# Patient Record
Sex: MALE | Race: BLACK OR AFRICAN AMERICAN | NOT HISPANIC OR LATINO | Employment: FULL TIME | URBAN - METROPOLITAN AREA
[De-identification: names, ages, dates, MRNs, and addresses within clinical notes are randomized per-mention and may not be internally consistent; named-entity substitution may affect disease eponyms.]

---

## 2017-07-26 ENCOUNTER — OFFICE VISIT (OUTPATIENT)
Dept: FAMILY MEDICINE | Facility: HOSPITAL | Age: 42
End: 2017-07-26
Attending: FAMILY MEDICINE

## 2017-07-26 VITALS
HEIGHT: 67 IN | BODY MASS INDEX: 32.35 KG/M2 | WEIGHT: 206.13 LBS | SYSTOLIC BLOOD PRESSURE: 129 MMHG | HEART RATE: 69 BPM | DIASTOLIC BLOOD PRESSURE: 80 MMHG

## 2017-07-26 DIAGNOSIS — R23.8 DRY SCALP: ICD-10-CM

## 2017-07-26 DIAGNOSIS — N50.89 TESTICULAR SWELLING, RIGHT: ICD-10-CM

## 2017-07-26 DIAGNOSIS — F32.A DEPRESSION, UNSPECIFIED DEPRESSION TYPE: ICD-10-CM

## 2017-07-26 DIAGNOSIS — Z72.51 UNPROTECTED SEX: Primary | ICD-10-CM

## 2017-07-26 PROCEDURE — 99213 OFFICE O/P EST LOW 20 MIN: CPT | Performed by: FAMILY MEDICINE

## 2017-07-26 RX ORDER — KETOCONAZOLE 20 MG/ML
SHAMPOO, SUSPENSION TOPICAL
Qty: 120 ML | Refills: 1 | Status: SHIPPED | OUTPATIENT
Start: 2017-07-27 | End: 2021-01-15

## 2017-07-26 RX ORDER — FLUOXETINE HYDROCHLORIDE 20 MG/1
60 CAPSULE ORAL DAILY
Qty: 90 CAPSULE | Refills: 6 | Status: SHIPPED | OUTPATIENT
Start: 2017-07-26 | End: 2017-08-01 | Stop reason: SDUPTHER

## 2017-07-26 NOTE — PROGRESS NOTES
"Subjective:       Patient ID: Daniel Akins Jr. is a 41 y.o. male.    Chief Complaint: Penis Pain; Dysuria; and Hair/Scalp Problem    HPI Pt is 42 yo male with a hx of depression who presents to clinic complaining of burning with urination for a month. Also endorses white penile discharge x1 that occurred 2 weeks ago. He denies hematuria. Pt previously complained of these symptoms twice before last year with negative GC/CT, RPR, HIV. Is currently sexually active with female partner but denies urinary or STI sx in partner. Also reports occasional pain and swelling in his right testicle. Pt was concerned that his symptoms were related to cysts that were found on his girlfriend's ovaries. Also complains of excessive dry, pruritic skin on his scalp especially after a haircut. Has had this problem since childhood.  Pt reports taking Prozac  40 mg as prescribed and that he thinks it helps, but reports still being "christian and irritated" at times.    Review of Systems   Constitutional: Negative for activity change, appetite change, chills and fever.   Eyes: Negative for visual disturbance.   Respiratory: Negative for cough and shortness of breath.    Cardiovascular: Negative for chest pain.   Gastrointestinal: Negative for vomiting.   Endocrine: Negative for polydipsia and polyuria.   Genitourinary: Positive for discharge, dysuria and scrotal swelling. Negative for frequency and hematuria.   Musculoskeletal: Negative for arthralgias.   Skin: Negative for rash.   Neurological: Negative for dizziness and headaches.   Psychiatric/Behavioral:        Depressed mood       Objective:      Vitals:    07/26/17 1113   BP: 129/80   Pulse: 69     Physical Exam   Constitutional: He is oriented to person, place, and time. He appears well-developed and well-nourished.   HENT:   Head: Normocephalic and atraumatic.   Scattered areas of dry flaky scalp   Cardiovascular: Normal rate, regular rhythm and normal heart sounds.  "   Pulmonary/Chest: Effort normal and breath sounds normal.   Abdominal: Soft. Bowel sounds are normal.   Genitourinary: Prostate normal and penis normal. No penile tenderness.   Genitourinary Comments: No penile discharge expressed. Mild swelling of right testicle with bilateral mild TTP however no erythema. No penile rashes or sores   Musculoskeletal: Normal range of motion.   Neurological: He is alert and oriented to person, place, and time.       U/A: Negative for Leukocytes, Nitrates and blood    Assessment:       1. Unprotected sex    2. Depression, unspecified depression type    3. Testicular swelling, right    4. Dry scalp        Plan:       Unprotected sex  -     POCT URINALYSIS-Negative for Leukocytes, Nitrates and blood  -     RPR; Future; Expected date: 07/26/2017  -     C. trachomatis/N. gonorrhoeae by AMP DNA Urine  -     Hepatitis panel, acute; Future; Expected date: 07/26/2017  -     HIV-1 and HIV-2 antibodies; Future; Expected date: 07/26/2017  -     Encouraged safe sex practices    Depression, unspecified depression type    -   fluoxetine (PROZAC) 20 MG capsule; Take 3 capsules (60 mg total) by mouth once         daily.  Dispense: 90 capsule; Refill: 6           -  PHQ-9 today 15, previously 11 last year. Will increase from 40 mg to 60 mg    Testicular swelling, right  -     US Scrotum And Testicles; Future; Expected date: 07/26/2017    Dry scalp   -     ketoconazole (NIZORAL) 2 % shampoo; Apply topically twice a week.  Dispense:             120 mL; Refill: 1      Return in about 4 weeks (around 8/23/2017).     Brittney Villalta MD  7/26/2017  [facility] HO[facility]

## 2017-07-27 ENCOUNTER — TELEPHONE (OUTPATIENT)
Dept: FAMILY MEDICINE | Facility: HOSPITAL | Age: 42
End: 2017-07-27

## 2017-08-02 RX ORDER — FLUOXETINE HYDROCHLORIDE 20 MG/1
60 CAPSULE ORAL DAILY
Qty: 90 CAPSULE | Refills: 6 | Status: SHIPPED | OUTPATIENT
Start: 2017-08-02 | End: 2021-01-15

## 2017-08-30 ENCOUNTER — OFFICE VISIT (OUTPATIENT)
Dept: FAMILY MEDICINE | Facility: HOSPITAL | Age: 42
End: 2017-08-30

## 2017-08-30 VITALS
HEART RATE: 67 BPM | SYSTOLIC BLOOD PRESSURE: 112 MMHG | BODY MASS INDEX: 34.44 KG/M2 | WEIGHT: 214.31 LBS | HEIGHT: 66 IN | DIASTOLIC BLOOD PRESSURE: 69 MMHG

## 2017-08-30 DIAGNOSIS — L84 CALLUS OF FOOT: ICD-10-CM

## 2017-08-30 DIAGNOSIS — R30.0 DYSURIA: Primary | ICD-10-CM

## 2017-08-30 DIAGNOSIS — F32.A DEPRESSION, UNSPECIFIED DEPRESSION TYPE: ICD-10-CM

## 2017-08-30 PROCEDURE — 99213 OFFICE O/P EST LOW 20 MIN: CPT | Performed by: FAMILY MEDICINE

## 2017-08-30 NOTE — PROGRESS NOTES
I assume primary medical responsibility for this patient, I have reviewed the case history, findings, diagnosis and treatment plan with the resident and agree that the care is reasonable and necessary. This service has been performed by a resident without the presence of a teaching physician under the primary care exception  Dyan Blackmon  8/30/2017

## 2017-08-30 NOTE — PROGRESS NOTES
Subjective:       Patient ID: Daniel Akins Jr. is a 41 y.o. male.    Chief Complaint: Follow-up    HPI Patient is 42 yo male with a PMHx of Depression who presents to clinic for follow-up. At last visit, Prozac dosage was increased from 40-60 mg. Patient endorses improved mood after increase. Does endorse intermittent burning with urination and days following sexual intercourse/ejaculation. Also admits to burning discomfort with bowel movement in addition to pressure like sensation following bowel movement. Denies penile discharge, hematuria, bloody stools, changes in bowel habits. Has had intermittent dysuria x 9 months with workup including U/A, RPR, GC/CT, HIV all which have been negative. Patient has also been empirically treated for GC/CT. Does endorse unprotected sex with ex-wife.    Review of Systems   Constitutional: Negative for chills and fever.   Respiratory: Negative for shortness of breath.    Gastrointestinal: Positive for rectal pain (pressure like discomfort after bowel movement). Negative for abdominal pain, nausea and vomiting.   Endocrine: Negative for polyuria.   Genitourinary: Positive for dysuria (intermittent). Negative for decreased urine volume, discharge, frequency, genital sores, penile pain, testicular pain and urgency.   Musculoskeletal: Negative for arthralgias and back pain.       Objective:      Vitals:    08/30/17 1349   BP: 112/69   Pulse: 67     Physical Exam   Constitutional: He is oriented to person, place, and time. He appears well-developed and well-nourished.   HENT:   Head: Normocephalic and atraumatic.   Cardiovascular: Normal rate, regular rhythm, normal heart sounds and intact distal pulses.    Pulmonary/Chest: Effort normal and breath sounds normal.   Abdominal: Soft. Bowel sounds are normal. There is no tenderness.   Genitourinary:   Genitourinary Comments: Declined  exam   Musculoskeletal: Normal range of motion.   Neurological: He is alert and oriented to person,  place, and time.   Skin: Skin is warm and dry.   Right 5th toe callus   Psychiatric: Judgment and thought content normal.       Assessment:       1. Dysuria    2. Callus of foot    3. Depression, unspecified depression type        Plan:       Dysuria  -     Ambulatory referral to Urology        -     Patient with chronic intermittent dysuria and painful ejaculation and bowel                   movements with negative workup and continued symptoms despite empiric               treatment    Callus of foot  -     salicylic acid 17 % gel; Apply topically once daily.; Refill: 0  -     Right foot 5th toe    Depression        -    Improved mood after increasing Fluoxetine from 40 mg to 60 mg at last visit.              Will continue 60 mg    Return in about 4 weeks (around 9/27/2017).     Brittney Villalta MD  8/30/2017  [facility]

## 2017-11-13 ENCOUNTER — OFFICE VISIT (OUTPATIENT)
Dept: FAMILY MEDICINE | Facility: HOSPITAL | Age: 42
End: 2017-11-13
Attending: FAMILY MEDICINE

## 2017-11-13 VITALS
HEART RATE: 83 BPM | HEIGHT: 66 IN | DIASTOLIC BLOOD PRESSURE: 71 MMHG | SYSTOLIC BLOOD PRESSURE: 127 MMHG | BODY MASS INDEX: 35.89 KG/M2 | WEIGHT: 223.31 LBS

## 2017-11-13 DIAGNOSIS — Z23 NEED FOR PROPHYLACTIC VACCINATION AND INOCULATION AGAINST INFLUENZA: ICD-10-CM

## 2017-11-13 DIAGNOSIS — K92.1 BLOOD IN STOOL: ICD-10-CM

## 2017-11-13 DIAGNOSIS — N41.9 PROSTATITIS, UNSPECIFIED PROSTATITIS TYPE: Primary | ICD-10-CM

## 2017-11-13 PROBLEM — R30.0 DYSURIA: Status: ACTIVE | Noted: 2017-11-13

## 2017-11-13 PROCEDURE — 99213 OFFICE O/P EST LOW 20 MIN: CPT | Performed by: FAMILY MEDICINE

## 2017-11-13 RX ORDER — CIPROFLOXACIN 500 MG/1
500 TABLET ORAL EVERY 12 HOURS
Qty: 42 TABLET | Refills: 0 | Status: SHIPPED | OUTPATIENT
Start: 2017-11-13 | End: 2017-12-04

## 2017-11-13 NOTE — PROGRESS NOTES
Subjective:       Patient ID: Daniel Akins Jr. is a 41 y.o. male.    Chief Complaint: Rectal Bleeding    HPI Patient is 42 yo male with a PMHx of Depression who presents to clinic for follow-up. Patient reports intermittent burning with urination. Does endorse unprotected sex last week. Denies penile discharge or blood. Denies current burning pain with sexual intercourse/ejaculation but did endorse previously. Also admits to rectal discomfort with bowel movement. Has had intermittent dysuria x 9 months with workup including U/A, RPR, GC/CT, HIV all which have been negative. Patient has also been empirically treated for GC/CT.  Reports episode last night of bright red blood per rectum mixed with stool, in toilet water and on toilet paper. Denies previous episodes. Has BM 1-2x per day and denies changes in frequency. Does say at baseline caliber of stool frequently changes.  Denies NSAID use or GERD.     Review of Systems   Constitutional: Negative for chills and fever (last week).   Respiratory: Negative for shortness of breath.    Gastrointestinal: Negative for abdominal pain, nausea and vomiting.   Endocrine: Negative for polyuria.   Genitourinary: Positive for dysuria (intermittent). Negative for decreased urine volume, discharge, frequency, genital sores, penile pain, testicular pain and urgency.   Musculoskeletal: Negative for arthralgias and back pain.       Objective:      Vitals:    11/13/17 1519   BP: 127/71   Pulse: 83     Physical Exam   Constitutional: He is oriented to person, place, and time. He appears well-developed and well-nourished.   HENT:   Head: Normocephalic and atraumatic.   Cardiovascular: Normal rate, regular rhythm, normal heart sounds and intact distal pulses.    Pulmonary/Chest: Effort normal and breath sounds normal.   Abdominal: Soft. Bowel sounds are normal. There is no tenderness.   Genitourinary: Penis normal.   Genitourinary Comments: No penile discharge. No external or internal  hemorrhoids appreciated, slightly boggy prostate   Musculoskeletal: Normal range of motion.   Neurological: He is alert and oriented to person, place, and time.   Skin: Skin is warm and dry.   Psychiatric: Judgment and thought content normal.       Assessment:       1. Prostatitis, unspecified prostatitis type    2. Blood in stool    3. Need for prophylactic vaccination and inoculation against influenza        Plan:       Prostatitis, unspecified prostatitis type        -    Pt with complaints of intermittent dysuria,                    ejaculatory pain. Mildly boggy prostate on physical             exam  -     ciprofloxacin HCl (CIPRO) 500 MG tablet; Take 1 tablet (500 mg total) by mouth every 12 (twelve) hours.  Dispense: 42 tablet; Refill: 0    Blood in stool         -    Clinic fecal occult blood test negative         -    No hemorrhoids on PE         -    Asked patient to return if further episodes of                  bloody stool and will further work-up    Need for prophylactic vaccination and inoculation against influenza      Return if symptoms worsen or fail to improve.     Brittney Villalta MD  11/13/2017  [facility] HO[facility]          Flu consent signed by patient. Flu vaccine administered.

## 2018-01-06 ENCOUNTER — HOSPITAL ENCOUNTER (EMERGENCY)
Facility: HOSPITAL | Age: 43
Discharge: HOME | End: 2018-01-06
Attending: EMERGENCY MEDICINE

## 2018-01-06 VITALS
OXYGEN SATURATION: 99 % | SYSTOLIC BLOOD PRESSURE: 135 MMHG | TEMPERATURE: 99 F | RESPIRATION RATE: 18 BRPM | BODY MASS INDEX: 35.51 KG/M2 | DIASTOLIC BLOOD PRESSURE: 82 MMHG | HEART RATE: 91 BPM | WEIGHT: 220 LBS

## 2018-01-06 DIAGNOSIS — H10.029 PINK EYE DISEASE, UNSPECIFIED LATERALITY: Primary | ICD-10-CM

## 2018-01-06 PROCEDURE — 99283 EMERGENCY DEPT VISIT LOW MDM: CPT

## 2018-01-06 PROCEDURE — 99283 EMERGENCY DEPT VISIT LOW MDM: CPT | Mod: ,,, | Performed by: EMERGENCY MEDICINE

## 2018-01-06 RX ORDER — ERYTHROMYCIN 5 MG/G
OINTMENT OPHTHALMIC
Qty: 1 TUBE | Refills: 0 | Status: SHIPPED | OUTPATIENT
Start: 2018-01-06 | End: 2021-01-15

## 2018-01-06 NOTE — ED NOTES
Appearance:  Pt awake, alert & oriented to person, place & time.  Pt in no acute distress at present time.  Skin:  Skin warm, dry & intact.  Mucous membranes moist.  Skin turgor normal.  Respiratory:  Respirations even, non-labored.    Eyes:  Left eye redness.

## 2018-01-06 NOTE — ED PROVIDER NOTES
Encounter Date: 1/6/2018    SCRIBE #1 NOTE: I, Genevieve Mora, am scribing for, and in the presence of, Dr. Monroe. the APC attestation.       History     Chief Complaint   Patient presents with    Eye Problem     Think i got the pink eye. C/o left eye redness and drainage     Patient is a 42-year-old male with significant past medical history presents to the emergency department due to a 1 day history of red eye.  Patient states that his nephew was recently diagnosed with pinkeye and has been staying with him.  Patient states that he began having a itchy and right eye yesterday.  Patient states that he woke this morning and his eye was closed shut with some yellow mucus discharge.  The patient denies any fevers, chills, chest pain, shortness of breath, cough, cold, or any other complaints.      The history is provided by the patient and medical records.     Review of patient's allergies indicates:  No Known Allergies  History reviewed. No pertinent past medical history.  Past Surgical History:   Procedure Laterality Date    ADENOIDECTOMY      TONSILLECTOMY       History reviewed. No pertinent family history.  Social History   Substance Use Topics    Smoking status: Former Smoker     Years: 10.00     Types: Cigarettes     Quit date: 7/9/2006    Smokeless tobacco: Never Used    Alcohol use No     Review of Systems   Constitutional: Negative for activity change, appetite change, chills, fatigue and fever.   HENT: Negative for congestion, drooling, ear discharge, ear pain, facial swelling, hearing loss, mouth sores, sore throat and trouble swallowing.    Eyes: Positive for discharge and redness. Negative for photophobia and pain.   Respiratory: Negative for apnea, cough, chest tightness, shortness of breath and wheezing.    Cardiovascular: Negative for chest pain and leg swelling.   Gastrointestinal: Negative for abdominal distention, abdominal pain, blood in stool, constipation, diarrhea, nausea and vomiting.    Endocrine: Negative for cold intolerance, polydipsia and polyuria.   Genitourinary: Negative for decreased urine volume, difficulty urinating, enuresis, frequency and hematuria.   Musculoskeletal: Negative for arthralgias, gait problem, myalgias and neck stiffness.   Skin: Negative for color change, rash and wound.   Allergic/Immunologic: Negative for environmental allergies.   Neurological: Negative for dizziness, tremors, syncope, weakness, light-headedness, numbness and headaches.   Hematological: Negative for adenopathy.   Psychiatric/Behavioral: Negative for agitation.       Physical Exam     Initial Vitals [01/06/18 0915]   BP Pulse Resp Temp SpO2   135/82 91 18 98.8 °F (37.1 °C) 99 %      MAP       99.67         Physical Exam    Nursing note and vitals reviewed.  Constitutional: He appears well-developed and well-nourished. He is not diaphoretic. No distress.   HENT:   Head: Normocephalic and atraumatic.   Eyes: EOM are normal. Pupils are equal, round, and reactive to light.   Red left eye    Neck: Normal range of motion. Neck supple.   Cardiovascular: Normal rate, regular rhythm and normal heart sounds. Exam reveals no gallop and no friction rub.    No murmur heard.  Pulmonary/Chest: Breath sounds normal. He has no wheezes. He has no rhonchi. He has no rales.   Abdominal: Soft. Bowel sounds are normal. There is no tenderness. There is no rebound and no guarding.   Musculoskeletal: Normal range of motion.   Neurological: He is alert and oriented to person, place, and time.   Skin: Skin is warm and dry. No rash noted. No erythema.   Psychiatric: He has a normal mood and affect.         ED Course   Procedures  Labs Reviewed - No data to display          Medical Decision Making:   History:   Old Medical Records: I decided to obtain old medical records.       APC / Resident Notes:   Patient is a 42-year-old male with significant past medical history presents to the emergency department due to a 1 day history of  red eye.  Physical exam reveals no distress.  Heart regular rhythm.  Lungs clear to auscultation bilaterally.  Abdomen soft nontender nondistended.  Left eye noted with redness.  Vision 20/20.  Visual be discharged home on erythromycin for presumed conjunctivitis.  Treatment discussed with attending physician he is agreeable.       Scribe Attestation:   Scribe #1: I performed the above scribed service and the documentation accurately describes the services I performed. I attest to the accuracy of the note.    Attending Attestation:     Physician Attestation Statement for NP/PA:   I discussed this assessment and plan of this patient with the NP/PA, but I did not personally examine the patient. The face to face encounter was performed by the NP/PA.                  ED Course      Clinical Impression:   The encounter diagnosis was Pink eye disease, unspecified laterality.    Disposition:   Disposition: Discharged  Condition: Stable                        Bhavana Luong PA-C  01/06/18 2812

## 2018-01-10 ENCOUNTER — OFFICE VISIT (OUTPATIENT)
Dept: FAMILY MEDICINE | Facility: HOSPITAL | Age: 43
End: 2018-01-10
Attending: FAMILY MEDICINE

## 2018-01-10 VITALS
TEMPERATURE: 99 F | DIASTOLIC BLOOD PRESSURE: 93 MMHG | HEART RATE: 95 BPM | BODY MASS INDEX: 36.06 KG/M2 | WEIGHT: 229.75 LBS | HEIGHT: 67 IN | SYSTOLIC BLOOD PRESSURE: 135 MMHG

## 2018-01-10 DIAGNOSIS — H10.022 PINK EYE, LEFT: ICD-10-CM

## 2018-01-10 DIAGNOSIS — J32.9 SINUSITIS, UNSPECIFIED CHRONICITY, UNSPECIFIED LOCATION: Primary | ICD-10-CM

## 2018-01-10 PROCEDURE — 99213 OFFICE O/P EST LOW 20 MIN: CPT | Performed by: FAMILY MEDICINE

## 2018-01-10 RX ORDER — CIPROFLOXACIN HYDROCHLORIDE 3 MG/ML
1 SOLUTION/ DROPS OPHTHALMIC
Qty: 5 ML | Refills: 0 | Status: SHIPPED | OUTPATIENT
Start: 2018-01-10 | End: 2021-01-15

## 2018-01-10 RX ORDER — FLUTICASONE PROPIONATE 50 MCG
1 SPRAY, SUSPENSION (ML) NASAL DAILY
Qty: 9.9 BOTTLE | Refills: 2 | Status: SHIPPED | OUTPATIENT
Start: 2018-01-10 | End: 2018-05-02 | Stop reason: SDUPTHER

## 2018-01-11 NOTE — PROGRESS NOTES
Subjective:       Patient ID: Daniel Akins Jr. is a 42 y.o. male.    Chief Complaint: Conjunctivitis and Sinusitis    HPI   Patient is a 42 year old male presenting to clinic for left eye irritation for about 5 days.  Patient was seen in the ED and diagnosed with left sided pink eye.  He was given erythromycin cream to use and states the cream has made his eye worse.  Reports worsening redness, itching, burning sensation in the left eye, crusting upon waking up in the AM.  States he is concerned his right eye is getting infected as well.  Patient's nephew with recent diagnosis of pink eye as well.      Review of Systems   Constitutional: Negative for chills and fever.   HENT: Negative for sore throat.    Eyes: Positive for discharge and redness. Negative for visual disturbance.   Respiratory: Negative for cough, shortness of breath and wheezing.    Cardiovascular: Negative for chest pain, palpitations and leg swelling.   Gastrointestinal: Negative for abdominal pain, constipation, diarrhea, nausea and vomiting.   Genitourinary: Negative for difficulty urinating.   Musculoskeletal: Negative for arthralgias and myalgias.   Neurological: Negative for dizziness and seizures.   Psychiatric/Behavioral: The patient is not nervous/anxious.        Objective:      Vitals:    01/10/18 1455   BP: (!) 135/93   Pulse: 95   Temp: 98.8 °F (37.1 °C)     Physical Exam   Constitutional: He is oriented to person, place, and time. He appears well-developed and well-nourished.   HENT:   Head: Normocephalic and atraumatic.   Left eye injected sclera and conjunctiva   Eyes: Conjunctivae and EOM are normal. Pupils are equal, round, and reactive to light.   Neck: Normal range of motion. No JVD present.   Cardiovascular: Normal rate and regular rhythm.    Pulmonary/Chest: Effort normal and breath sounds normal. No respiratory distress. He has no wheezes. He has no rales.   Abdominal: Soft. Bowel sounds are normal. He exhibits no  distension. There is no tenderness.   Musculoskeletal: Normal range of motion.   Neurological: He is alert and oriented to person, place, and time. No cranial nerve deficit.   Skin: Skin is warm. No rash noted.   Psychiatric: He has a normal mood and affect. His behavior is normal. Judgment and thought content normal.       Assessment:       1. Sinusitis, unspecified chronicity, unspecified location    2. Pink eye, left        Plan:       Sinusitis, unspecified chronicity, unspecified location        -     fluticasone (FLONASE) 50 mcg/actuation nasal spray; 1 spray (50 mcg total) by Each Nare route once daily.  Dispense: 9.9 Bottle; Refill: 2    Pink eye, left  -     ciprofloxacin HCl (CILOXAN) 0.3 % ophthalmic solution; Place 1 drop into both eyes every 2 (two) hours.  Dispense: 5 mL; Refill: 0  -     Cold compresses and flushes with artificial tears.    Return if symptoms worsen or fail to improve.      Carli Lemus MD  [facility],  3  1/10/2018  6:17 PM

## 2018-01-12 NOTE — PROGRESS NOTES
I assume primary medical responsibility for this patient, I have reviewed the case history, findings, diagnosis and treatment plan with the resident and agree that the care is reasonable and necessary. This service has been performed by a resident without the presence of a teaching physician under the primary care exception  Dyan Blackmon  1/12/2018

## 2018-05-02 ENCOUNTER — OFFICE VISIT (OUTPATIENT)
Dept: FAMILY MEDICINE | Facility: HOSPITAL | Age: 43
End: 2018-05-02
Attending: FAMILY MEDICINE

## 2018-05-02 VITALS
SYSTOLIC BLOOD PRESSURE: 124 MMHG | HEART RATE: 80 BPM | BODY MASS INDEX: 36.88 KG/M2 | WEIGHT: 235 LBS | DIASTOLIC BLOOD PRESSURE: 85 MMHG | HEIGHT: 67 IN

## 2018-05-02 DIAGNOSIS — J30.9 ALLERGIC RHINITIS, UNSPECIFIED SEASONALITY, UNSPECIFIED TRIGGER: ICD-10-CM

## 2018-05-02 DIAGNOSIS — R30.0 DYSURIA: Primary | ICD-10-CM

## 2018-05-02 PROCEDURE — 99213 OFFICE O/P EST LOW 20 MIN: CPT | Performed by: FAMILY MEDICINE

## 2018-05-02 PROCEDURE — 81000 URINALYSIS NONAUTO W/SCOPE: CPT | Performed by: FAMILY MEDICINE

## 2018-05-02 RX ORDER — FLUTICASONE PROPIONATE 50 MCG
1 SPRAY, SUSPENSION (ML) NASAL DAILY
Qty: 9.9 BOTTLE | Refills: 2 | Status: SHIPPED | OUTPATIENT
Start: 2018-05-02

## 2018-05-02 RX ORDER — FLUTICASONE PROPIONATE 50 MCG
1 SPRAY, SUSPENSION (ML) NASAL DAILY
Qty: 9.9 BOTTLE | Refills: 2 | Status: SHIPPED | OUTPATIENT
Start: 2018-05-02 | End: 2018-05-02 | Stop reason: SDUPTHER

## 2018-05-02 NOTE — PROGRESS NOTES
Subjective:       Patient ID: Daniel Akins Jr. is a 42 y.o. male.    Chief Complaint: Flank Pain    HPI: Mr. Akins is a 43 yo male who presented with dysuria and congestion symptoms. He describes a feeling of fluid dripping into the back of his throat causing him to cough consistent with post nasal drip. Also, 1 1/2 weeks ago he started to have some burning while urinating, lower abdominal pain, and cloudy urine with an odor. He also describes having some night sweats that began around this time. Pt reports having some right flank pain that he rates as an 8/10. Pt denies any discharge from his penis. He has not been having any chills and has been afebrile.     Review of Systems   Constitutional: Positive for diaphoresis. Negative for chills, fatigue, fever and unexpected weight change.   HENT: Positive for congestion and postnasal drip. Negative for ear discharge, ear pain, hearing loss, rhinorrhea, sinus pain and sinus pressure.    Eyes: Negative for visual disturbance.   Respiratory: Negative for cough, chest tightness and shortness of breath.    Cardiovascular: Negative for chest pain.   Gastrointestinal: Negative for abdominal pain, blood in stool, constipation and rectal pain.   Genitourinary: Positive for dysuria and flank pain. Negative for discharge, penile pain and urgency.   Musculoskeletal: Negative for arthralgias, joint swelling and neck pain.   Skin: Negative for rash.   Neurological: Negative for dizziness and numbness.       Objective:      Vitals:    05/02/18 1601   BP: 124/85   Pulse: 80     Physical Exam   HENT:   Head: Normocephalic.   Right Ear: External ear normal.   Left Ear: External ear normal.   Mouth/Throat: No oropharyngeal exudate.   Eyes: Conjunctivae and EOM are normal. Pupils are equal, round, and reactive to light. Right eye exhibits no discharge. Left eye exhibits no discharge. No scleral icterus.   Cardiovascular: Normal rate, regular rhythm, normal heart sounds and intact  distal pulses.    No murmur heard.  Pulmonary/Chest: Effort normal and breath sounds normal. No stridor. No respiratory distress. He has no rales.   Abdominal: Soft. Bowel sounds are normal. He exhibits no distension. There is no tenderness.   Musculoskeletal: He exhibits no edema or tenderness.   Lymphadenopathy:     He has no cervical adenopathy.   Skin: Skin is warm and dry. Capillary refill takes less than 2 seconds. No rash noted. No erythema.   Nursing note and vitals reviewed.      Assessment:       1. Dysuria    2. Allergic rhinitis, unspecified seasonality, unspecified trigger        Plan:       Dysuria  -     POCT URINALYSIS, negative for any infection   -     Pt will go to [facility] to have GC/CT testing performed  -     Encourage water intake    Allergic rhinitis, unspecified seasonality, unspecified trigger  -     fluticasone (FLONASE) 50 mcg/actuation nasal spray; 1 spray (50 mcg total) by Each Nare route once daily.  Dispense: 9.9 Bottle; Refill: 2      Follow-up in about 4 weeks (around 5/30/2018).      Pt discussed with Dr. Blackmon.    Briseida Florentino MD

## 2018-05-03 LAB
BILIRUB SERPL-MCNC: NORMAL MG/DL
BLOOD, POC UA: NORMAL
GLUCOSE UR QL STRIP: NORMAL
KETONES UR QL STRIP: NORMAL
LEUKOCYTE ESTERASE URINE, POC: NORMAL
NITRITE, POC UA: NORMAL
PH, POC UA: 7
PROTEIN, POC: NORMAL
SPECIFIC GRAVITY, POC UA: 1.02
UROBILINOGEN, POC UA: NORMAL

## 2018-05-03 NOTE — PROGRESS NOTES
I assume primary medical responsibility for this patient, I have reviewed the case history, findings, diagnosis and treatment plan with the resident and agree that the care is reasonable and necessary. This service has been performed by a resident without the presence of a teaching physician under the primary care exception  Dyan Blackmon  5/3/2018

## 2019-01-30 ENCOUNTER — OCCUPATIONAL HEALTH (OUTPATIENT)
Dept: URGENT CARE | Facility: CLINIC | Age: 44
End: 2019-01-30

## 2019-01-30 DIAGNOSIS — Z00.00 ENCOUNTER FOR PHYSICAL EXAMINATION: Primary | ICD-10-CM

## 2019-01-30 PROCEDURE — 99499 PHYSICAL - BASIC COMPLEXITY: ICD-10-PCS | Mod: S$GLB,,, | Performed by: NURSE PRACTITIONER

## 2019-01-30 PROCEDURE — 99499 UNLISTED E&M SERVICE: CPT | Mod: S$GLB,,, | Performed by: NURSE PRACTITIONER

## 2019-11-25 ENCOUNTER — OFFICE VISIT (OUTPATIENT)
Dept: URGENT CARE | Facility: CLINIC | Age: 44
End: 2019-11-25
Payer: COMMERCIAL

## 2019-11-25 VITALS
TEMPERATURE: 99 F | RESPIRATION RATE: 17 BRPM | DIASTOLIC BLOOD PRESSURE: 82 MMHG | SYSTOLIC BLOOD PRESSURE: 144 MMHG | HEART RATE: 88 BPM | WEIGHT: 200 LBS | HEIGHT: 67 IN | OXYGEN SATURATION: 98 % | BODY MASS INDEX: 31.39 KG/M2

## 2019-11-25 DIAGNOSIS — A64 STD (MALE): Primary | ICD-10-CM

## 2019-11-25 DIAGNOSIS — R30.0 DYSURIA: ICD-10-CM

## 2019-11-25 LAB
BILIRUB UR QL STRIP: NEGATIVE
GLUCOSE UR QL STRIP: NEGATIVE
KETONES UR QL STRIP: NEGATIVE
LEUKOCYTE ESTERASE UR QL STRIP: NEGATIVE
PH, POC UA: 7 (ref 5–8)
POC BLOOD, URINE: NEGATIVE
POC NITRATES, URINE: NEGATIVE
PROT UR QL STRIP: NEGATIVE
SP GR UR STRIP: 1.02 (ref 1–1.03)
UROBILINOGEN UR STRIP-ACNC: NORMAL (ref 0.3–2.2)

## 2019-11-25 PROCEDURE — 87491 CHLMYD TRACH DNA AMP PROBE: CPT

## 2019-11-25 PROCEDURE — 96372 THER/PROPH/DIAG INJ SC/IM: CPT | Mod: S$GLB,,, | Performed by: FAMILY MEDICINE

## 2019-11-25 PROCEDURE — 3008F BODY MASS INDEX DOCD: CPT | Mod: CPTII,S$GLB,, | Performed by: FAMILY MEDICINE

## 2019-11-25 PROCEDURE — 86592 SYPHILIS TEST NON-TREP QUAL: CPT

## 2019-11-25 PROCEDURE — 99214 OFFICE O/P EST MOD 30 MIN: CPT | Mod: 25,S$GLB,, | Performed by: FAMILY MEDICINE

## 2019-11-25 PROCEDURE — 96372 PR INJECTION,THERAP/PROPH/DIAG2ST, IM OR SUBCUT: ICD-10-PCS | Mod: S$GLB,,, | Performed by: FAMILY MEDICINE

## 2019-11-25 PROCEDURE — 3008F PR BODY MASS INDEX (BMI) DOCUMENTED: ICD-10-PCS | Mod: CPTII,S$GLB,, | Performed by: FAMILY MEDICINE

## 2019-11-25 PROCEDURE — 81003 URINALYSIS AUTO W/O SCOPE: CPT | Mod: QW,S$GLB,, | Performed by: FAMILY MEDICINE

## 2019-11-25 PROCEDURE — 81003 POCT URINALYSIS, DIPSTICK, AUTOMATED, W/O SCOPE: ICD-10-PCS | Mod: QW,S$GLB,, | Performed by: FAMILY MEDICINE

## 2019-11-25 PROCEDURE — 99214 PR OFFICE/OUTPT VISIT, EST, LEVL IV, 30-39 MIN: ICD-10-PCS | Mod: 25,S$GLB,, | Performed by: FAMILY MEDICINE

## 2019-11-25 PROCEDURE — 86703 HIV-1/HIV-2 1 RESULT ANTBDY: CPT

## 2019-11-25 RX ORDER — AZITHROMYCIN 1 G/1
1 POWDER, FOR SUSPENSION ORAL ONCE
Qty: 1 PACKET | Refills: 0 | Status: SHIPPED | OUTPATIENT
Start: 2019-11-25 | End: 2019-11-25

## 2019-11-25 RX ORDER — CEFTRIAXONE 250 MG/1
250 INJECTION, POWDER, FOR SOLUTION INTRAMUSCULAR; INTRAVENOUS
Status: COMPLETED | OUTPATIENT
Start: 2019-11-25 | End: 2019-11-25

## 2019-11-25 RX ADMIN — CEFTRIAXONE 250 MG: 250 INJECTION, POWDER, FOR SOLUTION INTRAMUSCULAR; INTRAVENOUS at 05:11

## 2019-11-25 NOTE — PATIENT INSTRUCTIONS
If You Think You Have an STD  Treating a sexually transmitted disease (STD) early limits the problems they can cause. If you have an STD, get treated right away. Ask your partner to be tested, too. Then avoid sex until youve finished treatment and your healthcare provider says its OK to have sex again.    Follow your treatment plan  Treatment depends on the type of STD you have. Common treatments include injections and oral pills or liquids. Creams and gels can be applied to sores caused by certain STDs. Follow the tips below:  · Get new treatment for each new STD.  · Dont use old medicine, even for the same STD. Use medicines as directed.  · Dont share medicine unless instructed to do so by your healthcare provider or clinic.  Talk to your partner  If you have an STD, its your duty to tell all your recent partners so they can be tested and treated. This is one important way to prevent the disease from being spread. Telling a partner that you have an STD can be hard. You may be embarrassed, angry, or afraid. Its often unclear who had the STD first. So try not to place blame. Your healthcare provider may offer some advice on how to begin.  Prevent future problems  Even after youve been treated, you can still be infected again. This is a common problem. It happens when a partner passes the STD back to you. To avoid this, your partner must be tested. He or she may also need treatment. After treatment, go to any scheduled follow-up visits. Then prevent future problems with safer sex. Limit your number of partners and always use a latex condom.  Diagnosing STDS  Your healthcare provider will take a health history and examine you. During your health history, you will be asked about your sex habits and health history. You may also be asked about drug use. Give honest answers. Your healthcare provider will then check your body for signs of STDs. He or she also may perform one or more of the following  tests:  · Fluid is swabbed from any sores. Samples also may be taken from the vagina, penis, mouth, or rectum. The samples are then tested for STDs.  · Blood or urine samples may be taken. They are checked for viruses or bacteria that cause STDs.  · For women, cells from the cervix (where the vagina and uterus meet) are checked for signs of cancer. This is called a Pap smear. If cell changes are found, a magnifying scope may be used to take a closer look (colposcopy).   Date Last Reviewed: 12/1/2016  © 4955-6845 [employer]. [address] [place] [address], [place] [address]. All rights reserved. This information is not intended as a substitute for professional medical care. Always follow your healthcare professional's instructions.

## 2019-11-25 NOTE — PROGRESS NOTES
"Subjective:       Patient ID: Daniel Akins Jr. is a 43 y.o. male.    Vitals:  height is 5' 7" (1.702 m) and weight is 90.7 kg (200 lb). His oral temperature is 98.5 °F (36.9 °C). His blood pressure is 144/82 (abnormal) and his pulse is 88. His respiration is 17 and oxygen saturation is 98%.     Chief Complaint: Groin Pain    43 years old male coming with complaint of penile discharge and dysuria foot 7 days state his partner is being treated for Chlamydia which he is not sure if with Sia gonorrhea patient denies any fever denies any significant abdominal pain stays it hurts when urinates.    Groin Pain   The patient's primary symptoms include penile discharge. The patient's pertinent negatives include no penile pain, scrotal swelling or testicular pain. This is a new problem. The current episode started in the past 7 days. The problem occurs constantly. The problem has been unchanged. The pain is mild. Associated symptoms include abdominal pain. Pertinent negatives include no chills, dysuria, fever, frequency, nausea, rash, urgency or vomiting. The penile discharge was clear. The symptoms are aggravated by urination. He has tried nothing for the symptoms. The treatment provided no relief. He is sexually active. He inconsistently uses condoms. Yes, his partner has an STD. His past medical history is significant for chlamydia. (Partner with chlamydia)       Constitution: Negative for chills and fever.   Neck: Negative for painful lymph nodes.   Gastrointestinal: Positive for abdominal pain. Negative for nausea and vomiting.   Genitourinary: Positive for urine decreased, genital sore and penile discharge. Negative for dysuria, frequency, urgency, hematuria, history of kidney stones, genital trauma, painful intercourse, painful ejaculation, penile pain, penile swelling, scrotal swelling and testicular pain.   Musculoskeletal: Negative for back pain.   Skin: Negative for rash and lesion.   Hematologic/Lymphatic: " Negative for swollen lymph nodes.       Objective:      Physical Exam   Constitutional: He is oriented to person, place, and time. He appears well-developed and well-nourished.   HENT:   Head: Normocephalic and atraumatic.   Right Ear: External ear normal.   Left Ear: External ear normal.   Nose: Nose normal.   Mouth/Throat: Mucous membranes are normal.   Eyes: Conjunctivae and lids are normal.   Neck: Trachea normal and full passive range of motion without pain. Neck supple.   Cardiovascular: Normal rate, regular rhythm and normal heart sounds.   Pulmonary/Chest: Effort normal and breath sounds normal. No respiratory distress.   Abdominal: Soft. Normal appearance and bowel sounds are normal. He exhibits no distension, no abdominal bruit, no pulsatile midline mass and no mass. There is no tenderness.   Genital exam, no tenderness no rash active discharge seen testicle and scrotal exam within normal limits no hernia , slightly supra pubic tenderness noted no guarding or rebounding found, bowel sounds +ve   Musculoskeletal: Normal range of motion. He exhibits no edema.   Neurological: He is alert and oriented to person, place, and time. He has normal strength.   Skin: Skin is warm, dry, intact, not diaphoretic and not pale.   Psychiatric: He has a normal mood and affect. His speech is normal and behavior is normal. Judgment and thought content normal. Cognition and memory are normal.   Nursing note and vitals reviewed.        Assessment:       1. STD (male)    2. Dysuria        Plan:         STD (male)  -     C. trachomatis/N. gonorrhoeae by AMP DNA  -     RPR  -     HIV 1/2 Ag/Ab (4th Gen)    Dysuria  -     Urinalysis    Other orders  -     cefTRIAXone injection 250 mg  -     azithromycin (ZITHROMAX) 1 gram Pack; Take 1 g by mouth once. for 1 dose  Dispense: 1 packet; Refill: 0

## 2019-11-26 LAB
C TRACH DNA SPEC QL NAA+PROBE: NOT DETECTED
HIV 1+2 AB+HIV1 P24 AG SERPL QL IA: NEGATIVE
N GONORRHOEA DNA SPEC QL NAA+PROBE: NOT DETECTED
RPR SER QL: NORMAL

## 2019-11-27 ENCOUNTER — TELEPHONE (OUTPATIENT)
Dept: URGENT CARE | Facility: CLINIC | Age: 44
End: 2019-11-27

## 2019-11-27 NOTE — TELEPHONE ENCOUNTER
Discussed negative STD testing results.  Patient states he feels a little better.  Patient was instructed to follow up as needed.  Patient was appreciative.

## 2019-12-09 ENCOUNTER — OFFICE VISIT (OUTPATIENT)
Dept: PSYCHIATRY | Facility: CLINIC | Age: 44
End: 2019-12-09
Payer: COMMERCIAL

## 2019-12-09 VITALS
BODY MASS INDEX: 34.13 KG/M2 | DIASTOLIC BLOOD PRESSURE: 73 MMHG | HEART RATE: 69 BPM | SYSTOLIC BLOOD PRESSURE: 134 MMHG | WEIGHT: 217.94 LBS

## 2019-12-09 DIAGNOSIS — R41.840 DIFFICULTY CONCENTRATING: Primary | ICD-10-CM

## 2019-12-09 DIAGNOSIS — F32.5 MAJOR DEPRESSIVE DISORDER, SINGLE EPISODE IN FULL REMISSION: ICD-10-CM

## 2019-12-09 LAB
AMPHET+METHAMPHET UR QL: NEGATIVE
BARBITURATES UR QL SCN>200 NG/ML: NEGATIVE
BENZODIAZ UR QL SCN>200 NG/ML: NEGATIVE
BZE UR QL SCN: NEGATIVE
CANNABINOIDS UR QL SCN: NEGATIVE
CREAT UR-MCNC: 161 MG/DL (ref 23–375)
ETHANOL UR-MCNC: <10 MG/DL
METHADONE UR QL SCN>300 NG/ML: NEGATIVE
OPIATES UR QL SCN: NEGATIVE
PCP UR QL SCN>25 NG/ML: NEGATIVE
TOXICOLOGY INFORMATION: NORMAL

## 2019-12-09 PROCEDURE — 99999 PR PBB SHADOW E&M-EST. PATIENT-LVL II: CPT | Mod: PBBFAC,,, | Performed by: PSYCHIATRY & NEUROLOGY

## 2019-12-09 PROCEDURE — 90792 PSYCH DIAG EVAL W/MED SRVCS: CPT | Mod: S$GLB,,, | Performed by: PSYCHIATRY & NEUROLOGY

## 2019-12-09 PROCEDURE — 90792 PR PSYCHIATRIC DIAGNOSTIC EVALUATION W/MEDICAL SERVICES: ICD-10-PCS | Mod: S$GLB,,, | Performed by: PSYCHIATRY & NEUROLOGY

## 2019-12-09 PROCEDURE — 80307 DRUG TEST PRSMV CHEM ANLYZR: CPT

## 2019-12-09 PROCEDURE — 99999 PR PBB SHADOW E&M-EST. PATIENT-LVL II: ICD-10-PCS | Mod: PBBFAC,,, | Performed by: PSYCHIATRY & NEUROLOGY

## 2019-12-09 NOTE — PROGRESS NOTES
"Outpatient Psychiatry Initial Visit (MD/NP)    12/9/2019    Daniel Akins Jr., a 43 y.o. male, presenting for initial evaluation visit. Met with patient.    Reason for Encounter: self-referral. Patient complains of poor school performance and desiring ADHD evaluation.    History of Present Illness:   The patient reports that he has had difficulty with school since he was a child.  He reports having difficulty with math as well as reading and being put in special classes.  He denies that he was ever a discipline problem but reports that he was more frequently caught talking than other kids in the class.  He denies that he daydreamed.  He was not required to sit at the front of his class.  He did not get his high school diploma.    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  He is not in school this semester however.  He noticed that his son who is recently been diagnosed with ADHD has experienced a remarkable improvement in his grades and behavior since being started on medication.    The patient reports that he is unable to remain focused on his work for more than 15-20 minutes when studying in a relatively distraction free environment.  He describes being easily distracted if he is at home or if he is at [facility] and others are having a conversation near him.  He admits to frequently getting bored with doing the same task repeatedly, part of the reason why he has had so many different jobs.  He does not strongly endorse his mind wandering.  However he reports sometimes, "I've got so much going on cant focus on one thing at one time" and "I'm trying to do everything at once," and "constantly trying to find something to do."   He denies that he loses items frequently.  He denies being forgetful, but admits to compensating by putting appointments in the calendar on his phone.  He reports forgetting what he is saying in the " middle of conversations (does this during the interview).  He reports he rambles off subject when writing papers.    When he is in class he loses focus when he no longer understands what is being presented by the professor.  He denies day dreaming necessarily.  He reports that he generally is sitting in the front of the class and is not distracted there.    He denies difficulty remaining still.  He denies interrupting others in conversations.  He denies speaking impulsively unless he is in an argument with his wife.  He denies acting impulsively except for spending on recording equipment in the past.    The patient reports treatment for depression for 2-3 years about 2 years ago.  Depression was related to  from his wife.  At the time he experienced sadness, anhedonia, decreased self-worth, and hopelessness.  He admits that the medication helped him to be more calm and concentrate better however.    He denies a history of symptoms consistent with mya.  Denies significant anxiety.  He denies symptoms consistent with OCD, although he has at times focused on cleaning his home.        12/09/19  1046         ADULT ADHD Part A   How often do you have trouble wrapping up the final details of a project once the chanllenging parts have been done? Often   How often do you have difficulty getting things in order when you have to do a task that requires organization? Often   How often do you have problems remembering appointments or obligations? Rarely   When you have a task that requires a lot of thought, how often do you avoid or delay getting started? Never   How often do you fidget or squirm with your hands or feet when you have to sit down for a long time? Never   How often do you feel overly active and compelled to do things, like you were driven by a motor? Never   Part A Score 7 (calculated)   ADULT ADHD Part B   How often do you make careless mistakes when you have to work on a boring or difficult project?  Rarely   How often do you have difficulty keeping your attention when you are doing boring or repetitive work? Often   How often do you have difficulty concentrating on what people say to you, even when they are speaking to you directly? Often   How often do you misplace or have difficulty finding things at home or at work? Never   How often are you distracted by activity or noise around you? Often   How often do you leave your seat in meetings or other situations in which you are expected to remain seated? Never   How often do you feel restless or fidgety? Never   How often do you have difficulty unwinding and relaxing when you have time to yourself? Rarely   How often do you find yourself talking too much when you are in social situations? Often   When you're in a conversation, how often do you find yourself finishing the sentences of the people you are talking to before they can finish them themselves? Rarely   How often do you have difficulty waiting your turn in situations when turn taking is required? Rarely   How often do you interrupt others when they are busy? Never   Part B Score 16 (calculated)               Review Of Systems:   Review of Systems   Constitutional: Negative for chills, fever and weight loss.   HENT: Negative for hearing loss and tinnitus.    Eyes: Negative for blurred vision and double vision.   Respiratory: Negative for cough, shortness of breath and wheezing.    Cardiovascular: Negative for chest pain and palpitations.   Gastrointestinal: Negative for abdominal pain, diarrhea and vomiting.   Genitourinary: Negative for dysuria, frequency and hematuria.   Musculoskeletal: Negative for back pain and neck pain.   Skin: Negative for rash.   Neurological: Negative for dizziness, tremors and headaches.   Endo/Heme/Allergies: Does not bruise/bleed easily.         Current Evaluation:     Nutritional Screening: Considering the patient's height and weight, medications, medical history and  "preferences, should a referral be made to the dietitian? no    Constitutional  Vitals:  Most recent vital signs, dated less than 90 days prior to this appointment, were reviewed.    Vitals:    12/09/19 0943   BP: 134/73   Pulse: 69   Weight: 98.9 kg (217 lb 14.8 oz)        General:  age appropriate, normal weight, casually dressed, neatly groomed     Musculoskeletal  Muscle Strength/Tone:  no dyskinesia, no dystonia, no tremor   Gait & Station:  non-ataxic     Psychiatric  Speech:  Not pressured, clearly audible   Mood:   Affect:  euthymic  congruent and appropriate, appropriate, full, midline   Thought Process:  goal-directed, logical   Associations:  intact   Thought Content:  normal, no suicidality, no homicidality, delusions, or paranoia   Insight:  has awareness of illness   Judgement: behavior is adequate to circumstances   Orientation:  grossly intact   Memory: intact for content of interview   Language: grossly intact, Average verbal skills   Attention Span & Concentration:  Able to attend to interview without distraction, loses track of conversation twice   Fund of Knowledge:  Above level of education       Relevant Elements of Neurological Exam: normal gait    Functioning in Relationships:  Spouse/partner:  Not currently in a relationship, still ,  "its weird"  Peers:  repforts friends  Employers: Works as  for [employer]    Laboratory Data  Office Visit on 11/25/2019   Component Date Value Ref Range Status    Chlamydia, Amplified DNA 11/25/2019 Not Detected  Not Detected Final    N gonorrhoeae, amplified DNA 11/25/2019 Not Detected  Not Detected Final    RPR 11/25/2019 Non-reactive  Non-reactive Final    HIV 1/2 Ag/Ab 11/25/2019 Negative  Negative Final    POC Blood, Urine 11/25/2019 Negative  Negative Final    POC Bilirubin, Urine 11/25/2019 Negative  Negative Final    POC Urobilinogen, Urine 11/25/2019 Normal  0.3 - 2.2 Final    POC Ketones, Urine 11/25/2019 Negative  " Negative Final    POC Protein, Urine 11/25/2019 Negative  Negative Final    POC Nitrates, Urine 11/25/2019 Negative  Negative Final    POC Glucose, Urine 11/25/2019 Negative  Negative Final    pH, UA 11/25/2019 7.0  5 - 8 Final    POC Specific Gravity, Urine 11/25/2019 1.025  1.003 - 1.029 Final    POC Leukocytes, Urine 11/25/2019 Negative  Negative Final         Medications  Outpatient Encounter Medications as of 12/9/2019   Medication Sig Dispense Refill    ciprofloxacin HCl (CILOXAN) 0.3 % ophthalmic solution Place 1 drop into both eyes every 2 (two) hours. (Patient not taking: Reported on 11/25/2019) 5 mL 0    erythromycin (ROMYCIN) ophthalmic ointment Place a 1/2 inch ribbon of ointment into the lower eyelid 4 times a day for 5 days (Patient not taking: Reported on 11/25/2019) 1 Tube 0    fluoxetine (PROZAC) 20 MG capsule Take 3 capsules (60 mg total) by mouth once daily. 90 capsule 6    fluticasone (FLONASE) 50 mcg/actuation nasal spray 1 spray (50 mcg total) by Each Nare route once daily. (Patient not taking: Reported on 11/25/2019) 9.9 Bottle 2    ketoconazole (NIZORAL) 2 % shampoo Apply topically twice a week. (Patient not taking: Reported on 11/25/2019) 120 mL 1     No facility-administered encounter medications on file as of 12/9/2019.            Assessment - Diagnosis - Goals:     Impression:   43-year-old male with likely history of learning disabilities, possibly math disorder and reading disorder, who saw the benefits of ADHD treatment for his son and presented for evaluation of ADHD.  From the information provided during this interview a cannot clearly be determined that he suffers from this disorder.  Psychological testing, unfortunately not available at [facility], might help clarify this diagnosis.    Diagnosis:  MDD single episode in remission  Rule out ADHD  Rule out math disorder  Rule out reading disorder    Strengths and Liabilities: Strength: Patient accepts guidance/feedback,  Strength: Patient is motivated for change., Strength: Patient is physically healthy.    Treatment Goals:  Specify outcomes written in observable, behavioral terms:   Remain in remission from depression  Improved academic performance    Treatment Plan/Recommendations:   · The patient [facility] evaluation  · We discussed the possibility of restarting Prozac as this provided benefits with regard to his concentration in the past.  He chose not to pursue this at this time      Return to Clinic: as needed    Counseling time:  35 min  Total time:  60 min  Consulting clinician was informed of the encounter and consult note.

## 2020-07-10 ENCOUNTER — OFFICE VISIT (OUTPATIENT)
Dept: INTERNAL MEDICINE | Facility: CLINIC | Age: 45
End: 2020-07-10
Payer: COMMERCIAL

## 2020-07-10 VITALS
HEART RATE: 82 BPM | SYSTOLIC BLOOD PRESSURE: 120 MMHG | BODY MASS INDEX: 33.49 KG/M2 | OXYGEN SATURATION: 98 % | HEIGHT: 67 IN | WEIGHT: 213.38 LBS | DIASTOLIC BLOOD PRESSURE: 72 MMHG

## 2020-07-10 DIAGNOSIS — Z00.00 ANNUAL PHYSICAL EXAM: Primary | ICD-10-CM

## 2020-07-10 DIAGNOSIS — E66.09 CLASS 1 OBESITY DUE TO EXCESS CALORIES WITHOUT SERIOUS COMORBIDITY WITH BODY MASS INDEX (BMI) OF 33.0 TO 33.9 IN ADULT: ICD-10-CM

## 2020-07-10 PROBLEM — R30.0 DYSURIA: Status: RESOLVED | Noted: 2017-11-13 | Resolved: 2020-07-10

## 2020-07-10 PROBLEM — K92.1 BLOOD IN STOOL: Status: RESOLVED | Noted: 2017-11-13 | Resolved: 2020-07-10

## 2020-07-10 PROCEDURE — 99396 PR PREVENTIVE VISIT,EST,40-64: ICD-10-PCS | Mod: S$GLB,,, | Performed by: NURSE PRACTITIONER

## 2020-07-10 PROCEDURE — 99396 PREV VISIT EST AGE 40-64: CPT | Mod: S$GLB,,, | Performed by: NURSE PRACTITIONER

## 2020-07-10 PROCEDURE — 99999 PR PBB SHADOW E&M-EST. PATIENT-LVL III: CPT | Mod: PBBFAC,,, | Performed by: NURSE PRACTITIONER

## 2020-07-10 PROCEDURE — 99999 PR PBB SHADOW E&M-EST. PATIENT-LVL III: ICD-10-PCS | Mod: PBBFAC,,, | Performed by: NURSE PRACTITIONER

## 2020-07-10 NOTE — PROGRESS NOTES
Internal Medicine Annual Exam       CHIEF COMPLAINT     The patient, Daniel Akins Jr., who is a 44 y.o. male with ADD, anxiety, and depression who presents for an annual exam.    HPI   Here to establish with new PCP     Exercise- 4-5 days - cardio and weight training   Diet- meal prepping and skipping dinner.   Sleep - 8 hours per night     Eye- blurred at times.      Tdap-  prior to going to [place]       Past Medical History:  Past Medical History:   Diagnosis Date    Psychiatric problem        Past Surgical History:   Procedure Laterality Date    ADENOIDECTOMY      TONSILLECTOMY          Family History   Problem Relation Age of Onset    Hypertension Mother     Diabetes Father     ADD / ADHD Child         Social History     Socioeconomic History    Marital status: Legally      Spouse name: Not on file    Number of children: Not on file    Years of education: Not on file    Highest education level: Not on file   Occupational History    Not on file   Social Needs    Financial resource strain: Not on file    Food insecurity     Worry: Not on file     Inability: Not on file    Transportation needs     Medical: Not on file     Non-medical: Not on file   Tobacco Use    Smoking status: Former Smoker     Years: 10.00     Types: Cigarettes     Quit date: 2006     Years since quittin.0    Smokeless tobacco: Never Used    Tobacco comment: never bought cigarettes   Substance and Sexual Activity    Alcohol use: No    Drug use: No    Sexual activity: Not on file   Lifestyle    Physical activity     Days per week: Not on file     Minutes per session: Not on file    Stress: Not on file   Relationships    Social connections     Talks on phone: Not on file     Gets together: Not on file     Attends Yazdanism service: Not on file     Active member of club or organization: Not on file     Attends meetings of clubs or organizations: Not on file     Relationship status: Not on file    Other Topics Concern    Patient feels they ought to cut down on drinking/drug use Not Asked    Patient annoyed by others criticizing their drinking/drug use Not Asked    Patient has felt bad or guilty about drinking/drug use Not Asked    Patient has had a drink/used drugs as an eye opener in the AM Not Asked   Social History Narrative    Not on file        Social History     Tobacco Use   Smoking Status Former Smoker    Years: 10.00    Types: Cigarettes    Quit date: 2006    Years since quittin.0   Smokeless Tobacco Never Used   Tobacco Comment    never bought cigarettes        Allergies as of 07/10/2020    (No Known Allergies)          Home Medications:  Prior to Admission medications    Medication Sig Start Date End Date Taking? Authorizing Provider   ciprofloxacin HCl (CILOXAN) 0.3 % ophthalmic solution Place 1 drop into both eyes every 2 (two) hours.  Patient not taking: Reported on 7/10/2020 1/10/18   Carli Lemus MD   erythromycin (ROMYCIN) ophthalmic ointment Place a 1/2 inch ribbon of ointment into the lower eyelid 4 times a day for 5 days  Patient not taking: Reported on 7/10/2020 1/6/18   Bhavana Luong PA-C   fluoxetine (PROZAC) 20 MG capsule Take 3 capsules (60 mg total) by mouth once daily. 17  Brittney Villalta MD   fluticasone (FLONASE) 50 mcg/actuation nasal spray 1 spray (50 mcg total) by Each Nare route once daily.  Patient not taking: Reported on 7/10/2020 5/2/18   Briseida Florentino MD   ketoconazole (NIZORAL) 2 % shampoo Apply topically twice a week.  Patient not taking: Reported on 7/10/2020 7/27/17   Brittney Villalta MD       Review of Systems:  Review of Systems   Constitutional: Negative for chills, fatigue, fever and unexpected weight change.   HENT: Negative for congestion, ear pain, hearing loss, postnasal drip and sinus pressure.    Eyes: Positive for visual disturbance (blurred  - possibly near sighted ). Negative for pain and redness.  "  Respiratory: Negative for cough, shortness of breath and wheezing.    Cardiovascular: Negative for chest pain, palpitations and leg swelling.   Gastrointestinal: Negative for abdominal distention, abdominal pain, constipation, diarrhea, nausea and vomiting.   Endocrine: Negative for polydipsia, polyphagia and polyuria.   Genitourinary: Negative for dysuria, frequency, hematuria and urgency.   Musculoskeletal: Positive for arthralgias (rigth wrist - Pt is right handed - reports injury to outstretched hand 2017). Negative for gait problem and myalgias.   Skin: Negative for pallor and rash.   Allergic/Immunologic: Negative for environmental allergies and immunocompromised state.   Neurological: Negative for dizziness, weakness, light-headedness and headaches.   Hematological: Negative for adenopathy. Does not bruise/bleed easily.   Psychiatric/Behavioral: Negative for behavioral problems, confusion, dysphoric mood and sleep disturbance. The patient is not nervous/anxious.        Health Maintainence:   Immunizations:  Health Maintenance       Date Due Completion Date    Lipid Panel 1977 ---    TETANUS VACCINE 12/15/1995 ---    Influenza Vaccine (1) 09/01/2020 ---           PHYSICAL EXAM     /72 (BP Location: Left arm, Patient Position: Sitting, BP Method: Large (Manual))   Pulse 82   Ht 5' 6.5" (1.689 m)   Wt 96.8 kg (213 lb 6.5 oz)   SpO2 98%   BMI 33.93 kg/m²  Body mass index is 33.93 kg/m².    Physical Exam  Vitals signs reviewed.   Constitutional:       Appearance: He is well-developed.   HENT:      Head: Normocephalic.      Right Ear: External ear normal.      Left Ear: External ear normal.      Nose: Nose normal.      Mouth/Throat:      Pharynx: No oropharyngeal exudate.   Eyes:      Pupils: Pupils are equal, round, and reactive to light.   Neck:      Thyroid: No thyromegaly.      Vascular: No JVD.      Trachea: No tracheal deviation.   Cardiovascular:      Rate and Rhythm: Normal rate and " regular rhythm.      Heart sounds: No murmur. No friction rub. No gallop.    Pulmonary:      Effort: No respiratory distress.      Breath sounds: Normal breath sounds. No wheezing or rales.   Chest:      Chest wall: No tenderness.   Abdominal:      General: Bowel sounds are normal. There is no distension.      Palpations: Abdomen is soft.      Tenderness: There is no abdominal tenderness.   Musculoskeletal: Normal range of motion.         General: No tenderness.   Lymphadenopathy:      Cervical: No cervical adenopathy.   Skin:     General: Skin is warm and dry.      Findings: No rash.   Neurological:      Mental Status: He is alert and oriented to person, place, and time.   Psychiatric:         Behavior: Behavior normal.         LABS     No results found for: LABA1C, HGBA1C  CMP  Sodium   Date Value Ref Range Status   09/12/2016 140 136 - 145 mmol/L Final     Potassium   Date Value Ref Range Status   09/12/2016 4.2 3.5 - 5.1 mmol/L Final     Chloride   Date Value Ref Range Status   09/12/2016 104 95 - 110 mmol/L Final     CO2   Date Value Ref Range Status   09/12/2016 30 (H) 23 - 29 mmol/L Final     Glucose   Date Value Ref Range Status   09/12/2016 85 70 - 110 mg/dL Final     BUN, Bld   Date Value Ref Range Status   09/12/2016 9 6 - 20 mg/dL Final     Creatinine   Date Value Ref Range Status   09/12/2016 1.0 0.5 - 1.4 mg/dL Final     Calcium   Date Value Ref Range Status   09/12/2016 9.0 8.7 - 10.5 mg/dL Final     Total Protein   Date Value Ref Range Status   09/12/2016 6.6 6.0 - 8.4 g/dL Final     Albumin   Date Value Ref Range Status   09/12/2016 3.9 3.5 - 5.2 g/dL Final     Total Bilirubin   Date Value Ref Range Status   09/12/2016 0.7 0.1 - 1.0 mg/dL Final     Comment:     For infants and newborns, interpretation of results should be based  on gestational age, weight and in agreement with clinical  observations.  Premature Infant recommended reference ranges:  Up to 24 hours.............<8.0 mg/dL  Up to 48  hours............<12.0 mg/dL  3-5 days..................<15.0 mg/dL  6-29 days.................<15.0 mg/dL       Alkaline Phosphatase   Date Value Ref Range Status   09/12/2016 97 55 - 135 U/L Final     AST   Date Value Ref Range Status   09/12/2016 17 10 - 40 U/L Final     ALT   Date Value Ref Range Status   09/12/2016 14 10 - 44 U/L Final     Anion Gap   Date Value Ref Range Status   09/12/2016 6 (L) 8 - 16 mmol/L Final     eGFR if    Date Value Ref Range Status   09/12/2016 >60 >60 mL/min/1.73 m^2 Final     eGFR if non    Date Value Ref Range Status   09/12/2016 >60 >60 mL/min/1.73 m^2 Final     Comment:     Calculation used to obtain the estimated glomerular filtration  rate (eGFR) is the CKD-EPI equation. Since race is unknown   in our information system, the eGFR values for   -American and Non--American patients are given   for each creatinine result.       Lab Results   Component Value Date    WBC 4.53 09/12/2016    HGB 14.2 09/12/2016    HCT 44.0 09/12/2016    MCV 94 09/12/2016     09/12/2016     No results found for: CHOL  No results found for: HDL  No results found for: LDLCALC  No results found for: TRIG  No results found for: CHOLHDL  No results found for: TSH, S8VKJYN, P4UVDAS, THYROIDAB    ASSESSMENT/PLAN     Daniel LOUIS Tashi Magallon is a 44 y.o. male    Annual physical exam- all age and gender related screenings discussed.   -     CBC auto differential; Future; Expected date: 07/10/2020  -     Comprehensive metabolic panel; Future; Expected date: 07/10/2020  -     Lipid Panel; Future; Expected date: 07/10/2020  -     TSH; Future; Expected date: 07/10/2020  -     Vitamin D; Future; Expected date: 07/10/2020    Class 1 obesity due to excess calories without serious comorbidity with body mass index (BMI) of 33.0 to 33.9 in adult- discussed diet and exercise.     Follow up with PCP in 3 months to establish care     Carolyn CABEZAS, APRN, FNP-c    Department of Internal Medicine - [facility]  2:22 PM

## 2020-07-11 ENCOUNTER — LAB VISIT (OUTPATIENT)
Dept: LAB | Facility: HOSPITAL | Age: 45
End: 2020-07-11
Attending: FAMILY MEDICINE
Payer: COMMERCIAL

## 2020-07-11 DIAGNOSIS — Z00.00 ANNUAL PHYSICAL EXAM: ICD-10-CM

## 2020-07-11 LAB
25(OH)D3+25(OH)D2 SERPL-MCNC: 20 NG/ML (ref 30–96)
ALBUMIN SERPL BCP-MCNC: 4 G/DL (ref 3.5–5.2)
ALP SERPL-CCNC: 95 U/L (ref 55–135)
ALT SERPL W/O P-5'-P-CCNC: 21 U/L (ref 10–44)
ANION GAP SERPL CALC-SCNC: 8 MMOL/L (ref 8–16)
AST SERPL-CCNC: 22 U/L (ref 10–40)
BASOPHILS # BLD AUTO: 0.01 K/UL (ref 0–0.2)
BASOPHILS NFR BLD: 0.2 % (ref 0–1.9)
BILIRUB SERPL-MCNC: 0.5 MG/DL (ref 0.1–1)
BUN SERPL-MCNC: 20 MG/DL (ref 6–20)
CALCIUM SERPL-MCNC: 8.8 MG/DL (ref 8.7–10.5)
CHLORIDE SERPL-SCNC: 109 MMOL/L (ref 95–110)
CHOLEST SERPL-MCNC: 198 MG/DL (ref 120–199)
CHOLEST/HDLC SERPL: 4.2 {RATIO} (ref 2–5)
CO2 SERPL-SCNC: 25 MMOL/L (ref 23–29)
CREAT SERPL-MCNC: 1.1 MG/DL (ref 0.5–1.4)
DIFFERENTIAL METHOD: ABNORMAL
EOSINOPHIL # BLD AUTO: 0.1 K/UL (ref 0–0.5)
EOSINOPHIL NFR BLD: 0.8 % (ref 0–8)
ERYTHROCYTE [DISTWIDTH] IN BLOOD BY AUTOMATED COUNT: 12.3 % (ref 11.5–14.5)
EST. GFR  (AFRICAN AMERICAN): >60 ML/MIN/1.73 M^2
EST. GFR  (NON AFRICAN AMERICAN): >60 ML/MIN/1.73 M^2
GLUCOSE SERPL-MCNC: 88 MG/DL (ref 70–110)
HCT VFR BLD AUTO: 45.8 % (ref 40–54)
HDLC SERPL-MCNC: 47 MG/DL (ref 40–75)
HDLC SERPL: 23.7 % (ref 20–50)
HGB BLD-MCNC: 14.3 G/DL (ref 14–18)
IMM GRANULOCYTES # BLD AUTO: 0.02 K/UL (ref 0–0.04)
IMM GRANULOCYTES NFR BLD AUTO: 0.3 % (ref 0–0.5)
LDLC SERPL CALC-MCNC: 135.8 MG/DL (ref 63–159)
LYMPHOCYTES # BLD AUTO: 2.5 K/UL (ref 1–4.8)
LYMPHOCYTES NFR BLD: 40.6 % (ref 18–48)
MCH RBC QN AUTO: 29.6 PG (ref 27–31)
MCHC RBC AUTO-ENTMCNC: 31.2 G/DL (ref 32–36)
MCV RBC AUTO: 95 FL (ref 82–98)
MONOCYTES # BLD AUTO: 0.5 K/UL (ref 0.3–1)
MONOCYTES NFR BLD: 8.1 % (ref 4–15)
NEUTROPHILS # BLD AUTO: 3.1 K/UL (ref 1.8–7.7)
NEUTROPHILS NFR BLD: 50 % (ref 38–73)
NONHDLC SERPL-MCNC: 151 MG/DL
NRBC BLD-RTO: 0 /100 WBC
PLATELET # BLD AUTO: 202 K/UL (ref 150–350)
PMV BLD AUTO: 11.3 FL (ref 9.2–12.9)
POTASSIUM SERPL-SCNC: 4.1 MMOL/L (ref 3.5–5.1)
PROT SERPL-MCNC: 7.3 G/DL (ref 6–8.4)
RBC # BLD AUTO: 4.83 M/UL (ref 4.6–6.2)
SODIUM SERPL-SCNC: 142 MMOL/L (ref 136–145)
TRIGL SERPL-MCNC: 76 MG/DL (ref 30–150)
TSH SERPL DL<=0.005 MIU/L-ACNC: 0.96 UIU/ML (ref 0.4–4)
WBC # BLD AUTO: 6.16 K/UL (ref 3.9–12.7)

## 2020-07-11 PROCEDURE — 36415 COLL VENOUS BLD VENIPUNCTURE: CPT | Mod: PO

## 2020-07-11 PROCEDURE — 80053 COMPREHEN METABOLIC PANEL: CPT

## 2020-07-11 PROCEDURE — 85025 COMPLETE CBC W/AUTO DIFF WBC: CPT

## 2020-07-11 PROCEDURE — 82306 VITAMIN D 25 HYDROXY: CPT

## 2020-07-11 PROCEDURE — 84443 ASSAY THYROID STIM HORMONE: CPT

## 2020-07-11 PROCEDURE — 80061 LIPID PANEL: CPT

## 2020-07-14 ENCOUNTER — TELEPHONE (OUTPATIENT)
Dept: INTERNAL MEDICINE | Facility: CLINIC | Age: 45
End: 2020-07-14

## 2020-07-14 DIAGNOSIS — E55.9 VITAMIN D DEFICIENCY: Primary | ICD-10-CM

## 2020-07-14 RX ORDER — ERGOCALCIFEROL 1.25 MG/1
50000 CAPSULE ORAL
Qty: 12 CAPSULE | Refills: 0 | Status: SHIPPED | OUTPATIENT
Start: 2020-07-14 | End: 2020-10-12

## 2020-09-25 ENCOUNTER — OFFICE VISIT (OUTPATIENT)
Dept: URGENT CARE | Facility: CLINIC | Age: 45
End: 2020-09-25
Payer: COMMERCIAL

## 2020-09-25 VITALS
RESPIRATION RATE: 17 BRPM | DIASTOLIC BLOOD PRESSURE: 89 MMHG | HEIGHT: 66 IN | SYSTOLIC BLOOD PRESSURE: 124 MMHG | WEIGHT: 202 LBS | OXYGEN SATURATION: 97 % | TEMPERATURE: 98 F | HEART RATE: 78 BPM | BODY MASS INDEX: 32.47 KG/M2

## 2020-09-25 DIAGNOSIS — Z20.2 EXPOSURE TO CHLAMYDIA: Primary | ICD-10-CM

## 2020-09-25 DIAGNOSIS — A64 STD (MALE): ICD-10-CM

## 2020-09-25 LAB
BILIRUB UR QL STRIP: NEGATIVE
GLUCOSE UR QL STRIP: NEGATIVE
KETONES UR QL STRIP: NEGATIVE
LEUKOCYTE ESTERASE UR QL STRIP: NEGATIVE
PH, POC UA: 5.5 (ref 5–8)
POC BLOOD, URINE: NEGATIVE
POC NITRATES, URINE: NEGATIVE
PROT UR QL STRIP: NEGATIVE
SP GR UR STRIP: 1.03 (ref 1–1.03)
UROBILINOGEN UR STRIP-ACNC: ABNORMAL (ref 0.3–2.2)

## 2020-09-25 PROCEDURE — 96372 THER/PROPH/DIAG INJ SC/IM: CPT | Mod: S$GLB,,, | Performed by: NURSE PRACTITIONER

## 2020-09-25 PROCEDURE — 81003 POCT URINALYSIS, DIPSTICK, AUTOMATED, W/O SCOPE: ICD-10-PCS | Mod: QW,S$GLB,, | Performed by: NURSE PRACTITIONER

## 2020-09-25 PROCEDURE — 87491 CHLMYD TRACH DNA AMP PROBE: CPT

## 2020-09-25 PROCEDURE — 99213 PR OFFICE/OUTPT VISIT, EST, LEVL III, 20-29 MIN: ICD-10-PCS | Mod: 25,S$GLB,, | Performed by: NURSE PRACTITIONER

## 2020-09-25 PROCEDURE — 99213 OFFICE O/P EST LOW 20 MIN: CPT | Mod: 25,S$GLB,, | Performed by: NURSE PRACTITIONER

## 2020-09-25 PROCEDURE — 81003 URINALYSIS AUTO W/O SCOPE: CPT | Mod: QW,S$GLB,, | Performed by: NURSE PRACTITIONER

## 2020-09-25 PROCEDURE — 96372 PR INJECTION,THERAP/PROPH/DIAG2ST, IM OR SUBCUT: ICD-10-PCS | Mod: S$GLB,,, | Performed by: NURSE PRACTITIONER

## 2020-09-25 RX ORDER — CEFTRIAXONE 250 MG/1
250 INJECTION, POWDER, FOR SOLUTION INTRAMUSCULAR; INTRAVENOUS
Status: COMPLETED | OUTPATIENT
Start: 2020-09-25 | End: 2020-09-25

## 2020-09-25 RX ORDER — AZITHROMYCIN 500 MG/1
1000 TABLET, FILM COATED ORAL ONCE
Qty: 2 TABLET | Refills: 0 | Status: SHIPPED | OUTPATIENT
Start: 2020-09-25 | End: 2020-09-25

## 2020-09-25 RX ADMIN — CEFTRIAXONE 250 MG: 250 INJECTION, POWDER, FOR SOLUTION INTRAMUSCULAR; INTRAVENOUS at 03:09

## 2020-09-25 NOTE — PATIENT INSTRUCTIONS
Return to Urgent Care or go to ER if symptoms worsen or fail to improve.  Follow up with PCP as recommended for further management.   We will contact you with your test results in about 3-4 weeks-- although there is a delay in obtaining the test results, you have been treated for GC/CT.  Please abstain from sex x at least 5 days and wear condoms.     If You Think You Have an STD  Treating a sexually transmitted disease (STD) early limits the problems they can cause. If you have an STD, get treated right away. Ask your partner to be tested, too. Then avoid sex until youve finished treatment and your healthcare provider says its OK to have sex again.    Follow your treatment plan  Treatment depends on the type of STD you have. Common treatments include injections and oral pills or liquids. Creams and gels can be applied to sores caused by certain STDs. Follow the tips below:  · Get new treatment for each new STD.  · Dont use old medicine, even for the same STD. Use medicines as directed.  · Dont share medicine unless instructed to do so by your healthcare provider or clinic.  Talk to your partner  If you have an STD, its your duty to tell all your recent partners so they can be tested and treated. This is one important way to prevent the disease from being spread. Telling a partner that you have an STD can be hard. You may be embarrassed, angry, or afraid. Its often unclear who had the STD first. So try not to place blame. Your healthcare provider may offer some advice on how to begin.  Prevent future problems  Even after youve been treated, you can still be infected again. This is a common problem. It happens when a partner passes the STD back to you. To avoid this, your partner must be tested. He or she may also need treatment. After treatment, go to any scheduled follow-up visits. Then prevent future problems with safer sex. Limit your number of partners and always use a latex condom.  Diagnosing STDS  Your  healthcare provider will take a health history and examine you. During your health history, you will be asked about your sex habits and health history. You may also be asked about drug use. Give honest answers. Your healthcare provider will then check your body for signs of STDs. He or she also may perform one or more of the following tests:  · Fluid is swabbed from any sores. Samples also may be taken from the vagina, penis, mouth, or rectum. The samples are then tested for STDs.  · Blood or urine samples may be taken. They are checked for viruses or bacteria that cause STDs.  · For women, cells from the cervix (where the vagina and uterus meet) are checked for signs of cancer. This is called a Pap smear. If cell changes are found, a magnifying scope may be used to take a closer look (colposcopy).   Date Last Reviewed: 12/1/2016  © 3353-5792 [employer]. [address] [place] [address]. All rights reserved. This information is not intended as a substitute for professional medical care. Always follow your healthcare professional's instructions.        Chlamydia Urethritis, Treated (Male)  You have an infection in the channel in the penis that carries urine (the urethra). The infection is caused by the bacteria chlamydia. This infection is a sexually transmitted disease (STD). It is highly contagious. It's spread by sexual contact with an infected partner.     Symptoms most often begin within 1 week after you come in contact with the bacteria. But it may take 3 weeks for symptoms to show up. You may have a watery discharge from the penis and burning during urination.   This infection can be treated and cured. Treatment is with antibiotic medicine.  Home care  Follow these guidelines when caring for yourself at home:  · Your sexual partner needs to be treated even if he or she has no symptoms. Your partner should contact his or her own healthcare provider. Or your partner can go to an urgent  care clinic or your local health department to be examined and treated.  · Avoid sexual activity until both you and your partner have finished all antibiotic medicine. You should wait until your provider tells you that you are no longer contagious.  · Take all antibiotic medicine as directed until it is finished. If you stop the medicine before you have finished it, symptoms may come back.  · Learn about safe sex practices and use these in the future. The safest sex is with a partner who has tested negative and has sex only with you. Condoms can help stop spreading some STDs. These include gonorrhea, chlamydia, and HIV. But condoms are not a guarantee.  Follow-up care  Follow up with your health care provider, or as advised. If a culture test was taken, you may call as advised for the results. You should have another culture test 4 to 6 weeks after treatment. This is to make sure the infection is gone. Call your provider or your local health department for complete STD screening. This includes HIV testing. Call the [facility] National STD Hotline at 108-625-4934 for more information about STDs.  When to seek medical advice  Call your health care provider right away if any of these occur:  · You dont get better after 3 days of treatment  · You cant urinate because of the pain  · Rash or joint pain  · Painful sores on the penis  · Enlarged painful lumps (lymph nodes) in the groin  · Testicle pain or swelling of the scrotum  · Fever of 100.4°F (38°C) or above, lasting for 24 to 48 hours  · Blood in urine   Date Last Reviewed: 1/1/2017  © 2176-4808 Anita Margarita. [address] [place] [address], [place] [address]. All rights reserved. This information is not intended as a substitute for professional medical care. Always follow your healthcare professional's instructions.

## 2020-09-25 NOTE — PROGRESS NOTES
"Subjective:       Patient ID: Daniel Akins Jr. is a 44 y.o. male.    Vitals:  height is 5' 6" (1.676 m) and weight is 91.6 kg (202 lb). His temperature is 97.7 °F (36.5 °C). His blood pressure is 124/89 and his pulse is 78. His respiration is 17 and oxygen saturation is 97%.     Chief Complaint: Exposure to STD    Girlfriend tested positive for chlamydia. He stated she cheated on him and the relationship is over.  He is having burning when he urinates.     Exposure to STD  The patient's primary symptoms include penile discharge and penile pain. The patient's pertinent negatives include no pelvic pain, scrotal swelling or testicular pain. This is a new problem. The current episode started 1 to 4 weeks ago (1 week ago.). The problem has been unchanged. The pain is moderate. Associated symptoms include dysuria and urgency. Pertinent negatives include no abdominal pain, chills, fever, flank pain, frequency, nausea, rash or vomiting. The penile discharge was clear. Nothing aggravates the symptoms. He is sexually active.       Constitution: Negative for chills, sweating, fatigue and fever.   Neck: Negative for painful lymph nodes.   Gastrointestinal: Negative for abdominal pain, nausea and vomiting.   Genitourinary: Positive for dysuria, urgency, penile discharge and penile pain. Negative for frequency, urine decreased, flank pain, hematuria, genital trauma, painful intercourse, genital sore, painful ejaculation, penile swelling, scrotal swelling, testicular pain and pelvic pain.   Musculoskeletal: Positive for back pain.   Skin: Negative for rash and lesion.   Hematologic/Lymphatic: Negative for swollen lymph nodes.       Objective:      Physical Exam   Constitutional: He is oriented to person, place, and time. He appears well-developed. No distress.   HENT:   Head: Normocephalic and atraumatic.   Ears:   Right Ear: External ear normal.   Left Ear: External ear normal.   Nose: Nose normal. No nasal deformity. No " epistaxis.   Mouth/Throat: Oropharynx is clear and moist and mucous membranes are normal.   Eyes: Conjunctivae and lids are normal.   Neck: Trachea normal and phonation normal.   Cardiovascular: Normal rate.   Pulmonary/Chest: Effort normal.   Abdominal: Soft. Normal appearance. He exhibits no distension. There is no abdominal tenderness.   Musculoskeletal: Normal range of motion.   Neurological: He is alert and oriented to person, place, and time. He has normal reflexes.   Skin: Skin is warm, dry, intact and not diaphoretic. Psychiatric: His speech is normal and behavior is normal. Judgment and thought content normal.   Nursing note and vitals reviewed.        Assessment:       1. Exposure to chlamydia    2. STD (male)        Plan:         Exposure to chlamydia  -     C. trachomatis/N. gonorrhoeae by AMP DNA HailosLa Miu; Urine  -     cefTRIAXone injection 250 mg  -     azithromycin (ZITHROMAX) 500 MG tablet; Take 2 tablets (1,000 mg total) by mouth once. for 1 dose  Dispense: 2 tablet; Refill: 0    STD (male)  -     POCT Urinalysis, Dipstick, Automated, W/O Scope  -     C. trachomatis/N. gonorrhoeae by AMP DNA Hailosner; Urine  -     cefTRIAXone injection 250 mg  -     azithromycin (ZITHROMAX) 500 MG tablet; Take 2 tablets (1,000 mg total) by mouth once. for 1 dose  Dispense: 2 tablet; Refill: 0       Explained to patient that the CG/CT is taking about 3 weeks to result due to national shortage of test reagents.  Explained to patient that he will be treated today for GC/CT and to avoid sex x at least 5 days.  Patient verbalized understanding.

## 2020-12-31 ENCOUNTER — CLINICAL SUPPORT (OUTPATIENT)
Dept: URGENT CARE | Facility: CLINIC | Age: 45
End: 2020-12-31
Payer: COMMERCIAL

## 2020-12-31 DIAGNOSIS — Z20.822 ENCOUNTER FOR LABORATORY TESTING FOR COVID-19 VIRUS: Primary | ICD-10-CM

## 2020-12-31 LAB
CTP QC/QA: YES
SARS-COV-2 RDRP RESP QL NAA+PROBE: NEGATIVE

## 2020-12-31 PROCEDURE — U0002 COVID-19 LAB TEST NON-CDC: HCPCS | Mod: QW,S$GLB,, | Performed by: FAMILY MEDICINE

## 2020-12-31 PROCEDURE — U0002: ICD-10-PCS | Mod: QW,S$GLB,, | Performed by: FAMILY MEDICINE

## 2020-12-31 NOTE — PATIENT INSTRUCTIONS
CDC Testing and Quarantine Guidelines for patients with exposure to a known-positive COVID-19 person:  A close exposure is defined as anyone who has had an exposure (masked or unmasked) to a known COVID -19 positive person within 6 ft for longer than 15 minutes. If your exposure meets this definition you are required by CDC guidelines to quarantine for at least 7-10 days from time of exposure. The CDC states that a test can be performed for an asymptomatic patient (someone who does not have any symptoms) after a close exposure, and that a test should be done if you develop symptoms after a close exposure as described above.  Specifically, you can test at day 5 or later if asymptomatic, in order to get released from quarantine on day 7 or later.  If you develop symptoms sooner, you should test when your symptoms start.  If you meet the definition of a close exposure, it will not matter whether you are experiencing symptoms- a quarantine for at least 7-10 days after a close exposure is required by CDC guidelines.  Please note, if you decide to test as an asymptomatic during your quarantine and you are positive, you will be restarting your quarantine and moving from a possible 10 day quarantine (if you do not test), to a 11 day or greater quarantine.  The CDC also suggests people still monitor for symptoms for a full 14 days and remember that the shorter quarantine options do not replace initial CDC guidance.  The CDC continues to recommend quarantining for 14 days as the best way to reduce risk for spreading COVID-19 - however, this is only a recommendation.  If your exposure does not meet the above definition, you can return to your normal daily activities to include social distancing, wearing a mask and frequent handwashing.       NEGATIVE COVID TEST  You have tested negative for COVID-19 today.  If you did not have a close exposure (as defined below) you can return to your normal daily activities to include  "social distancing, wearing a mask and frequent handwashing.  A "close exposure" is defined as anyone who has had an exposure (masked or unmasked) to a known COVID -19 positive person within 6 ft for longer than 15 minutes. If your exposure meets this definition, you are required by CDC guidelines to quarantine for at least 7-10 days from time of exposure.  The CDC states that a test can be performed for an asymptomatic patient (someone who does not have any symptoms) after a close exposure, and that a test should be done if you develop symptoms after a close exposure as described above.  Specifically, you can test at day 5 or later if asymptomatic in order to get released from quarantine on day 7 or later.  If you develop symptoms sooner, you should test when your symptoms start.  If you developed symptoms since the exposure, and your test was negative today and less than 5 days from your exposure, you still have to quarantine for 7-10 days from the date of the exposure.  The 7-10 day quarantine begins from the day you were exposed, not the day of your test.  For example, if your exposure was on a Monday, and you waited until Friday of the same week to get tested and it was negative, your 7-10 day quarantine begins from that Monday, not the Friday you tested negative.  Please note, if you decide to test as an asymptomatic during your quarantine and you are positive, you will be restarting your quarantine and moving from a possible 10 day quarantine (if you do not test), to a 11 day or greater quarantine.            "

## 2021-01-15 ENCOUNTER — LAB VISIT (OUTPATIENT)
Dept: LAB | Facility: HOSPITAL | Age: 46
End: 2021-01-15

## 2021-01-15 ENCOUNTER — OFFICE VISIT (OUTPATIENT)
Dept: INTERNAL MEDICINE | Facility: CLINIC | Age: 46
End: 2021-01-15

## 2021-01-15 VITALS
WEIGHT: 213.38 LBS | OXYGEN SATURATION: 97 % | BODY MASS INDEX: 33.49 KG/M2 | SYSTOLIC BLOOD PRESSURE: 118 MMHG | DIASTOLIC BLOOD PRESSURE: 70 MMHG | HEIGHT: 67 IN | HEART RATE: 78 BPM

## 2021-01-15 DIAGNOSIS — R30.0 DYSURIA: Primary | ICD-10-CM

## 2021-01-15 DIAGNOSIS — Z86.59 HISTORY OF DEPRESSION: ICD-10-CM

## 2021-01-15 DIAGNOSIS — Z20.2 EXPOSURE TO STD: ICD-10-CM

## 2021-01-15 LAB
AMORPH CRY UR QL COMP ASSIST: NORMAL
BACTERIA #/AREA URNS AUTO: NORMAL /HPF
BILIRUB UR QL STRIP: NEGATIVE
CLARITY UR REFRACT.AUTO: ABNORMAL
COLOR UR AUTO: YELLOW
GLUCOSE UR QL STRIP: NEGATIVE
HGB UR QL STRIP: NEGATIVE
KETONES UR QL STRIP: NEGATIVE
LEUKOCYTE ESTERASE UR QL STRIP: NEGATIVE
MICROSCOPIC COMMENT: NORMAL
NITRITE UR QL STRIP: NEGATIVE
PH UR STRIP: 7 [PH] (ref 5–8)
PROT UR QL STRIP: NEGATIVE
RBC #/AREA URNS AUTO: 1 /HPF (ref 0–4)
SP GR UR STRIP: 1.03 (ref 1–1.03)
URN SPEC COLLECT METH UR: ABNORMAL

## 2021-01-15 PROCEDURE — 96372 THER/PROPH/DIAG INJ SC/IM: CPT | Mod: PBBFAC

## 2021-01-15 PROCEDURE — 99214 OFFICE O/P EST MOD 30 MIN: CPT | Mod: S$PBB,,, | Performed by: PHYSICIAN ASSISTANT

## 2021-01-15 PROCEDURE — 86703 HIV-1/HIV-2 1 RESULT ANTBDY: CPT

## 2021-01-15 PROCEDURE — 36415 COLL VENOUS BLD VENIPUNCTURE: CPT

## 2021-01-15 PROCEDURE — 86592 SYPHILIS TEST NON-TREP QUAL: CPT

## 2021-01-15 PROCEDURE — 81001 URINALYSIS AUTO W/SCOPE: CPT

## 2021-01-15 PROCEDURE — 99214 PR OFFICE/OUTPT VISIT, EST, LEVL IV, 30-39 MIN: ICD-10-PCS | Mod: S$PBB,,, | Performed by: PHYSICIAN ASSISTANT

## 2021-01-15 PROCEDURE — 87086 URINE CULTURE/COLONY COUNT: CPT

## 2021-01-15 PROCEDURE — 80074 ACUTE HEPATITIS PANEL: CPT

## 2021-01-15 PROCEDURE — 99999 PR PBB SHADOW E&M-EST. PATIENT-LVL III: CPT | Mod: PBBFAC,,, | Performed by: PHYSICIAN ASSISTANT

## 2021-01-15 PROCEDURE — 99999 PR PBB SHADOW E&M-EST. PATIENT-LVL III: ICD-10-PCS | Mod: PBBFAC,,, | Performed by: PHYSICIAN ASSISTANT

## 2021-01-15 RX ORDER — AZITHROMYCIN 1 G/1
1 POWDER, FOR SUSPENSION ORAL ONCE
Qty: 1 PACKET | Refills: 0 | Status: SHIPPED | OUTPATIENT
Start: 2021-01-15 | End: 2021-01-15

## 2021-01-15 RX ORDER — CEFTRIAXONE 250 MG/1
250 INJECTION, POWDER, FOR SOLUTION INTRAMUSCULAR; INTRAVENOUS
Status: COMPLETED | OUTPATIENT
Start: 2021-01-15 | End: 2021-01-15

## 2021-01-15 RX ORDER — METRONIDAZOLE 500 MG/1
500 TABLET ORAL 2 TIMES DAILY WITH MEALS
Qty: 14 TABLET | Refills: 0 | Status: SHIPPED | OUTPATIENT
Start: 2021-01-15 | End: 2021-01-22

## 2021-01-15 RX ORDER — KETOCONAZOLE 20 MG/ML
SHAMPOO, SUSPENSION TOPICAL
Qty: 120 ML | Refills: 1 | Status: SHIPPED | OUTPATIENT
Start: 2021-01-18

## 2021-01-15 RX ADMIN — CEFTRIAXONE SODIUM 250 MG: 250 INJECTION, POWDER, FOR SOLUTION INTRAMUSCULAR; INTRAVENOUS at 11:01

## 2021-01-16 LAB — RPR SER QL: NORMAL

## 2021-01-17 LAB — BACTERIA UR CULT: NO GROWTH

## 2021-01-18 LAB
HAV IGM SERPL QL IA: NEGATIVE
HBV CORE IGM SERPL QL IA: NEGATIVE
HBV SURFACE AG SERPL QL IA: NEGATIVE
HCV AB SERPL QL IA: NEGATIVE

## 2021-01-19 LAB — HIV 1+2 AB+HIV1 P24 AG SERPL QL IA: NEGATIVE

## 2021-01-20 ENCOUNTER — TELEPHONE (OUTPATIENT)
Dept: INTERNAL MEDICINE | Facility: CLINIC | Age: 46
End: 2021-01-20

## 2021-12-21 ENCOUNTER — OFFICE VISIT (OUTPATIENT)
Dept: URGENT CARE | Facility: CLINIC | Age: 46
End: 2021-12-21
Payer: COMMERCIAL

## 2021-12-21 VITALS
SYSTOLIC BLOOD PRESSURE: 143 MMHG | OXYGEN SATURATION: 98 % | DIASTOLIC BLOOD PRESSURE: 82 MMHG | WEIGHT: 205 LBS | TEMPERATURE: 99 F | BODY MASS INDEX: 32.18 KG/M2 | HEIGHT: 67 IN | RESPIRATION RATE: 20 BRPM | HEART RATE: 70 BPM

## 2021-12-21 DIAGNOSIS — U07.1 COVID-19 VIRUS DETECTED: ICD-10-CM

## 2021-12-21 DIAGNOSIS — J01.90 ACUTE SINUSITIS WITH SYMPTOMS GREATER THAN 10 DAYS: ICD-10-CM

## 2021-12-21 DIAGNOSIS — U07.1 COVID-19 VIRUS INFECTION: Primary | ICD-10-CM

## 2021-12-21 LAB
CTP QC/QA: YES
SARS-COV-2 RDRP RESP QL NAA+PROBE: POSITIVE

## 2021-12-21 PROCEDURE — 99214 PR OFFICE/OUTPT VISIT, EST, LEVL IV, 30-39 MIN: ICD-10-PCS | Mod: S$GLB,,, | Performed by: FAMILY MEDICINE

## 2021-12-21 PROCEDURE — U0002: ICD-10-PCS | Mod: QW,S$GLB,, | Performed by: FAMILY MEDICINE

## 2021-12-21 PROCEDURE — 99214 OFFICE O/P EST MOD 30 MIN: CPT | Mod: S$GLB,,, | Performed by: FAMILY MEDICINE

## 2021-12-21 PROCEDURE — U0002 COVID-19 LAB TEST NON-CDC: HCPCS | Mod: QW,S$GLB,, | Performed by: FAMILY MEDICINE

## 2021-12-21 RX ORDER — FLUTICASONE PROPIONATE 50 MCG
1 SPRAY, SUSPENSION (ML) NASAL DAILY
Qty: 9.9 ML | Refills: 0 | Status: SHIPPED | OUTPATIENT
Start: 2021-12-21

## 2021-12-21 RX ORDER — AMOXICILLIN AND CLAVULANATE POTASSIUM 875; 125 MG/1; MG/1
1 TABLET, FILM COATED ORAL 2 TIMES DAILY
Qty: 20 TABLET | Refills: 0 | Status: SHIPPED | OUTPATIENT
Start: 2021-12-21 | End: 2021-12-31

## 2021-12-22 ENCOUNTER — NURSE TRIAGE (OUTPATIENT)
Dept: ADMINISTRATIVE | Facility: CLINIC | Age: 46
End: 2021-12-22
Payer: COMMERCIAL

## 2022-06-29 ENCOUNTER — HOSPITAL ENCOUNTER (OUTPATIENT)
Dept: RADIOLOGY | Facility: HOSPITAL | Age: 47
Discharge: HOME | End: 2022-06-29
Attending: FAMILY MEDICINE
Payer: COMMERCIAL

## 2022-06-29 ENCOUNTER — TELEPHONE (OUTPATIENT)
Dept: FAMILY MEDICINE | Facility: CLINIC | Age: 47
End: 2022-06-29

## 2022-06-29 ENCOUNTER — OFFICE VISIT (OUTPATIENT)
Dept: FAMILY MEDICINE | Facility: CLINIC | Age: 47
End: 2022-06-29
Payer: COMMERCIAL

## 2022-06-29 ENCOUNTER — PATIENT MESSAGE (OUTPATIENT)
Dept: FAMILY MEDICINE | Facility: CLINIC | Age: 47
End: 2022-06-29

## 2022-06-29 VITALS
SYSTOLIC BLOOD PRESSURE: 122 MMHG | HEIGHT: 67 IN | HEART RATE: 65 BPM | WEIGHT: 219.38 LBS | OXYGEN SATURATION: 99 % | BODY MASS INDEX: 34.43 KG/M2 | DIASTOLIC BLOOD PRESSURE: 86 MMHG

## 2022-06-29 DIAGNOSIS — M25.531 CHRONIC WRIST PAIN, RIGHT: ICD-10-CM

## 2022-06-29 DIAGNOSIS — G89.29 CHRONIC WRIST PAIN, RIGHT: ICD-10-CM

## 2022-06-29 DIAGNOSIS — Z12.5 PROSTATE CANCER SCREENING: ICD-10-CM

## 2022-06-29 DIAGNOSIS — Z11.3 SCREENING FOR STD (SEXUALLY TRANSMITTED DISEASE): ICD-10-CM

## 2022-06-29 DIAGNOSIS — N34.2 URETHRITIS: ICD-10-CM

## 2022-06-29 DIAGNOSIS — Z23 ENCOUNTER FOR IMMUNIZATION: ICD-10-CM

## 2022-06-29 DIAGNOSIS — M25.562 ACUTE PAIN OF LEFT KNEE: ICD-10-CM

## 2022-06-29 DIAGNOSIS — B00.1 FEVER BLISTER: ICD-10-CM

## 2022-06-29 DIAGNOSIS — M25.531 RIGHT WRIST PAIN: Primary | ICD-10-CM

## 2022-06-29 DIAGNOSIS — E66.09 CLASS 1 OBESITY DUE TO EXCESS CALORIES WITHOUT SERIOUS COMORBIDITY WITH BODY MASS INDEX (BMI) OF 34.0 TO 34.9 IN ADULT: ICD-10-CM

## 2022-06-29 DIAGNOSIS — Z76.89 ENCOUNTER TO ESTABLISH CARE WITH NEW DOCTOR: Primary | ICD-10-CM

## 2022-06-29 PROCEDURE — 90715 TDAP VACCINE GREATER THAN OR EQUAL TO 7YO IM: ICD-10-PCS | Mod: S$GLB,,, | Performed by: FAMILY MEDICINE

## 2022-06-29 PROCEDURE — 73110 XR WRIST COMPLETE 3 VIEWS RIGHT: ICD-10-PCS | Mod: 26,RT,, | Performed by: RADIOLOGY

## 2022-06-29 PROCEDURE — 3079F DIAST BP 80-89 MM HG: CPT | Mod: CPTII,S$GLB,, | Performed by: FAMILY MEDICINE

## 2022-06-29 PROCEDURE — 73562 X-RAY EXAM OF KNEE 3: CPT | Mod: TC,FY,LT

## 2022-06-29 PROCEDURE — 3008F BODY MASS INDEX DOCD: CPT | Mod: CPTII,S$GLB,, | Performed by: FAMILY MEDICINE

## 2022-06-29 PROCEDURE — 1159F MED LIST DOCD IN RCRD: CPT | Mod: CPTII,S$GLB,, | Performed by: FAMILY MEDICINE

## 2022-06-29 PROCEDURE — 90471 TDAP VACCINE GREATER THAN OR EQUAL TO 7YO IM: ICD-10-PCS | Mod: S$GLB,,, | Performed by: FAMILY MEDICINE

## 2022-06-29 PROCEDURE — 3008F PR BODY MASS INDEX (BMI) DOCUMENTED: ICD-10-PCS | Mod: CPTII,S$GLB,, | Performed by: FAMILY MEDICINE

## 2022-06-29 PROCEDURE — 90471 IMMUNIZATION ADMIN: CPT | Mod: S$GLB,,, | Performed by: FAMILY MEDICINE

## 2022-06-29 PROCEDURE — 3079F PR MOST RECENT DIASTOLIC BLOOD PRESSURE 80-89 MM HG: ICD-10-PCS | Mod: CPTII,S$GLB,, | Performed by: FAMILY MEDICINE

## 2022-06-29 PROCEDURE — 3074F SYST BP LT 130 MM HG: CPT | Mod: CPTII,S$GLB,, | Performed by: FAMILY MEDICINE

## 2022-06-29 PROCEDURE — 73110 X-RAY EXAM OF WRIST: CPT | Mod: TC,FY,RT

## 2022-06-29 PROCEDURE — 99999 PR PBB SHADOW E&M-EST. PATIENT-LVL III: CPT | Mod: PBBFAC,,, | Performed by: FAMILY MEDICINE

## 2022-06-29 PROCEDURE — 99999 PR PBB SHADOW E&M-EST. PATIENT-LVL III: ICD-10-PCS | Mod: PBBFAC,,, | Performed by: FAMILY MEDICINE

## 2022-06-29 PROCEDURE — 90715 TDAP VACCINE 7 YRS/> IM: CPT | Mod: S$GLB,,, | Performed by: FAMILY MEDICINE

## 2022-06-29 PROCEDURE — 73110 X-RAY EXAM OF WRIST: CPT | Mod: 26,RT,, | Performed by: RADIOLOGY

## 2022-06-29 PROCEDURE — 73562 XR KNEE 3 VIEW LEFT: ICD-10-PCS | Mod: 26,LT,, | Performed by: RADIOLOGY

## 2022-06-29 PROCEDURE — 99204 OFFICE O/P NEW MOD 45 MIN: CPT | Mod: 25,S$GLB,, | Performed by: FAMILY MEDICINE

## 2022-06-29 PROCEDURE — 73562 X-RAY EXAM OF KNEE 3: CPT | Mod: 26,LT,, | Performed by: RADIOLOGY

## 2022-06-29 PROCEDURE — 1159F PR MEDICATION LIST DOCUMENTED IN MEDICAL RECORD: ICD-10-PCS | Mod: CPTII,S$GLB,, | Performed by: FAMILY MEDICINE

## 2022-06-29 PROCEDURE — 3074F PR MOST RECENT SYSTOLIC BLOOD PRESSURE < 130 MM HG: ICD-10-PCS | Mod: CPTII,S$GLB,, | Performed by: FAMILY MEDICINE

## 2022-06-29 PROCEDURE — 99204 PR OFFICE/OUTPT VISIT, NEW, LEVL IV, 45-59 MIN: ICD-10-PCS | Mod: 25,S$GLB,, | Performed by: FAMILY MEDICINE

## 2022-06-29 RX ORDER — VALACYCLOVIR HYDROCHLORIDE 1 G/1
1000 TABLET, FILM COATED ORAL 2 TIMES DAILY
Qty: 14 TABLET | Refills: 0 | Status: SHIPPED | OUTPATIENT
Start: 2022-06-29 | End: 2022-07-06

## 2022-06-29 RX ORDER — DOXYCYCLINE 100 MG/1
100 CAPSULE ORAL EVERY 12 HOURS
Qty: 14 CAPSULE | Refills: 0 | Status: SHIPPED | OUTPATIENT
Start: 2022-06-29 | End: 2022-07-06

## 2022-06-29 RX ORDER — DOXYCYCLINE 100 MG/1
100 CAPSULE ORAL EVERY 12 HOURS
COMMUNITY
Start: 2022-02-08 | End: 2022-06-29 | Stop reason: SDUPTHER

## 2022-06-29 NOTE — PROGRESS NOTES
(Portions of this note were dictated using voice recognition software and may contain dictation related errors in spelling/grammar/syntax not found on text review)    CC:   Chief Complaint   Patient presents with    Establish Care       HPI: 46 y.o. male presented to [facility] as a new patient.  He has medical history significant for anxiety and depression, ADHD, currently not taking any medications.  He reports having burning and irritation with urination, also has clear colored urethral discharge, symptoms started in the last 7 days, he had oral sex with a sexual partner and concerned about STD, also reports having fever blisters on inside of lips which is getting better, never had herpes before. He had fall on outstretched hand on the right side 1 year ago and since then having right wrist pain, he did not seek medical attention and no imaging was done, he reports range of motion is fine but has wrist pain with constant use of hand, he wears wrist brace for support, pain is worsened with movements, wants to have x-rays done.  He also has left knee pain which started in the last 4-6 weeks, he denies having any trauma/fall, describes as aching pain and discomfort, it is intermittent problem, today he feels better, no associated joint instability, numbness or tingling reported.  He denies having any cough, shortness of breath, chest pain, nausea, vomiting, abdominal pain, changes in bowel habits.  He is a former smoker, has no toxic habits.  He tries to do regular exercise.  He is due for blood workup and Tdap.      Past Medical History:   Diagnosis Date    ADHD (attention deficit hyperactivity disorder)     Anxiety     Anxiety 7/12/2016    Depression     Psychiatric problem        Past Surgical History:   Procedure Laterality Date    ADENOIDECTOMY      ADENOIDECTOMY      TONSILLECTOMY         Family History   Problem Relation Age of Onset    Hypertension Mother     Diabetes Father     ADD / ADHD  Child     Diabetes Maternal Uncle        Social History     Tobacco Use    Smoking status: Former Smoker     Years: 10.00     Types: Cigarettes     Quit date: 7/9/2006     Years since quitting: 15.9    Smokeless tobacco: Never Used    Tobacco comment: never bought cigarettes   Substance Use Topics    Alcohol use: No    Drug use: No       Lab Results   Component Value Date    WBC 4.74 06/29/2022    HGB 13.7 (L) 06/29/2022    HCT 42.2 06/29/2022    MCV 93 06/29/2022     06/29/2022    CHOL 198 07/11/2020    TRIG 76 07/11/2020    HDL 47 07/11/2020    ALT 21 07/11/2020    AST 22 07/11/2020    BILITOT 0.5 07/11/2020    ALKPHOS 95 07/11/2020     07/11/2020    K 4.1 07/11/2020     07/11/2020    CREATININE 1.1 07/11/2020    ESTGFRAFRICA >60.0 07/11/2020    EGFRNONAA >60.0 07/11/2020    CALCIUM 8.8 07/11/2020    ALBUMIN 4.0 07/11/2020    BUN 20 07/11/2020    CO2 25 07/11/2020    TSH 0.956 07/11/2020    LDLCALC 135.8 07/11/2020    GLU 88 07/11/2020    VIROORQW98VN 20 (L) 07/11/2020             Vital signs reviewed  PE:   APPEARANCE: Well nourished, well developed, in no acute distress.    HEAD: Normocephalic, atraumatic.  EYES: EOMI.  Conjunctivae noninjected.  NOSE: Mucosa pink. Airway clear.  MOUTH & THROAT: No tonsillar enlargement. No pharyngeal erythema or exudate.   NECK: Supple with no cervical lymphadenopathy.    CHEST: Good inspiratory effort. Lungs clear to auscultation with no wheezes or crackles.  CARDIOVASCULAR: Normal S1, S2. No rubs, murmurs, or gallops.  ABDOMEN: Bowel sounds normal. Not distended. Soft. No tenderness or masses. No organomegaly.  EXTREMITIES: No edema, cyanosis, or clubbing.right wrist little swollen on radial side, non tender, ROM normal  Left knee: normal    Review of Systems   Constitutional: Negative for chills, fatigue and fever.   HENT: Negative.    Respiratory: Negative for cough, shortness of breath and wheezing.    Cardiovascular: Negative for chest pain,  palpitations and leg swelling.   Gastrointestinal: Negative.    Genitourinary: Negative.    Musculoskeletal: Positive for arthralgias.   Neurological: Negative.    Psychiatric/Behavioral: Negative.    All other systems reviewed and are negative.      IMPRESSION  1. Encounter to establish care with new doctor    2. Prostate cancer screening    3. Screening for STD (sexually transmitted disease)    4. Urethritis    5. Chronic wrist pain, right    6. Acute pain of left knee    7. Encounter for immunization    8. Fever blister    9. Class 1 obesity due to excess calories without serious comorbidity with body mass index (BMI) of 34.0 to 34.9 in adult          PLAN      1. Prostate cancer screening  - PSA, Screening; Future      2. Encounter to establish care with new doctor  - CBC Auto Differential; Future  - Comprehensive Metabolic Panel; Future  - TSH; Future  - Lipid Panel; Future      3. Screening for STD (sexually transmitted disease)  - C. trachomatis/N. gonorrhoeae by AMP DNA; Future  - Trichomonas vaginalis, RNA, Qual, Urine; Future      4. Urethritis  - Urinalysis, Reflex to Urine Culture Urine, Clean Catch; Future  - doxycycline (VIBRAMYCIN) 100 MG Cap; Take 1 capsule (100 mg total) by mouth every 12 (twelve) hours. for 7 days  Dispense: 14 capsule; Refill: 0      5. Chronic wrist pain, right  - X-Ray Wrist Complete Right; Future  NSAID's as needed  Wrist brace      6. Acute pain of left knee  - X-Ray Knee 3 View Left; Future  NSAID's as needed      7. Encounter for immunization  - (In Office Administered) Tdap Vaccine       8. Fever blister  - valACYclovir (VALTREX) 1000 MG tablet; Take 1 tablet (1,000 mg total) by mouth 2 (two) times daily. for 7 days  Dispense: 14 tablet; Refill: 0      9. Class 1 obesity due to excess calories without serious comorbidity with body mass index (BMI) of 34.0 to 34.9 in adult  BMI 34  Counseling provided on healthy lifestyle, encouraged to do moderate intensity regular exercise  30 minutes every day 5 days a week, to include vegetables and fruits and cut back on saturated fats and carbohydrates.        SCREENINGS      Immunizations:   UTD with Covid an Tdap    Age/demographic appropriate health maintenance:    Health Maintenance Due   Topic Date Due    COVID-19 Vaccine (3 - Booster for Pfizer series) 11/04/2021           Yfn Vickers   6/29/2022

## 2022-06-30 ENCOUNTER — TELEPHONE (OUTPATIENT)
Dept: FAMILY MEDICINE | Facility: CLINIC | Age: 47
End: 2022-06-30
Payer: COMMERCIAL

## 2022-06-30 ENCOUNTER — PATIENT MESSAGE (OUTPATIENT)
Dept: FAMILY MEDICINE | Facility: CLINIC | Age: 47
End: 2022-06-30
Payer: COMMERCIAL

## 2022-06-30 NOTE — TELEPHONE ENCOUNTER
Spoke with lab yesterday.They stated they didn't have the urine specimen for the patient then hung up. called back again. They then stated the orders was cancelled out or not entered correctly.Spoke with  yesterday about the urine.  Stated dont worry about it she sent antibiotics already.

## 2022-06-30 NOTE — TELEPHONE ENCOUNTER
----- Message from David Canseco sent at 6/30/2022  3:44 AM CDT -----  Regarding: Lab Client Services  Contact: [phone]  Good Morning,     My name is David Canseco I work in the Lab Client Services. We had a problem with some lab work on this patient. If someone from your office could call us at [phone] or [phone] that would be great. Anyone in my department can help.      Thank you

## 2022-07-05 ENCOUNTER — PATIENT MESSAGE (OUTPATIENT)
Dept: FAMILY MEDICINE | Facility: CLINIC | Age: 47
End: 2022-07-05
Payer: COMMERCIAL

## 2022-08-01 ENCOUNTER — TELEPHONE (OUTPATIENT)
Dept: FAMILY MEDICINE | Facility: CLINIC | Age: 47
End: 2022-08-01
Payer: COMMERCIAL

## 2022-08-26 ENCOUNTER — OFFICE VISIT (OUTPATIENT)
Dept: URGENT CARE | Facility: CLINIC | Age: 47
End: 2022-08-26
Payer: COMMERCIAL

## 2022-08-26 VITALS
RESPIRATION RATE: 18 BRPM | TEMPERATURE: 98 F | HEART RATE: 70 BPM | DIASTOLIC BLOOD PRESSURE: 88 MMHG | SYSTOLIC BLOOD PRESSURE: 134 MMHG | OXYGEN SATURATION: 97 % | HEIGHT: 67 IN | BODY MASS INDEX: 33.74 KG/M2 | WEIGHT: 215 LBS

## 2022-08-26 DIAGNOSIS — R05.9 COUGH: Primary | ICD-10-CM

## 2022-08-26 DIAGNOSIS — J06.9 VIRAL URI WITH COUGH: ICD-10-CM

## 2022-08-26 LAB
CTP QC/QA: YES
SARS-COV-2 RDRP RESP QL NAA+PROBE: NEGATIVE

## 2022-08-26 PROCEDURE — 3008F BODY MASS INDEX DOCD: CPT | Mod: CPTII,S$GLB,, | Performed by: FAMILY MEDICINE

## 2022-08-26 PROCEDURE — 1160F PR REVIEW ALL MEDS BY PRESCRIBER/CLIN PHARMACIST DOCUMENTED: ICD-10-PCS | Mod: CPTII,S$GLB,, | Performed by: FAMILY MEDICINE

## 2022-08-26 PROCEDURE — 1159F PR MEDICATION LIST DOCUMENTED IN MEDICAL RECORD: ICD-10-PCS | Mod: CPTII,S$GLB,, | Performed by: FAMILY MEDICINE

## 2022-08-26 PROCEDURE — 99213 PR OFFICE/OUTPT VISIT, EST, LEVL III, 20-29 MIN: ICD-10-PCS | Mod: S$GLB,,, | Performed by: FAMILY MEDICINE

## 2022-08-26 PROCEDURE — 3079F DIAST BP 80-89 MM HG: CPT | Mod: CPTII,S$GLB,, | Performed by: FAMILY MEDICINE

## 2022-08-26 PROCEDURE — 3008F PR BODY MASS INDEX (BMI) DOCUMENTED: ICD-10-PCS | Mod: CPTII,S$GLB,, | Performed by: FAMILY MEDICINE

## 2022-08-26 PROCEDURE — 1159F MED LIST DOCD IN RCRD: CPT | Mod: CPTII,S$GLB,, | Performed by: FAMILY MEDICINE

## 2022-08-26 PROCEDURE — U0002: ICD-10-PCS | Mod: QW,S$GLB,, | Performed by: FAMILY MEDICINE

## 2022-08-26 PROCEDURE — U0002 COVID-19 LAB TEST NON-CDC: HCPCS | Mod: QW,S$GLB,, | Performed by: FAMILY MEDICINE

## 2022-08-26 PROCEDURE — 99213 OFFICE O/P EST LOW 20 MIN: CPT | Mod: S$GLB,,, | Performed by: FAMILY MEDICINE

## 2022-08-26 PROCEDURE — 3079F PR MOST RECENT DIASTOLIC BLOOD PRESSURE 80-89 MM HG: ICD-10-PCS | Mod: CPTII,S$GLB,, | Performed by: FAMILY MEDICINE

## 2022-08-26 PROCEDURE — 3075F SYST BP GE 130 - 139MM HG: CPT | Mod: CPTII,S$GLB,, | Performed by: FAMILY MEDICINE

## 2022-08-26 PROCEDURE — 1160F RVW MEDS BY RX/DR IN RCRD: CPT | Mod: CPTII,S$GLB,, | Performed by: FAMILY MEDICINE

## 2022-08-26 PROCEDURE — 3075F PR MOST RECENT SYSTOLIC BLOOD PRESS GE 130-139MM HG: ICD-10-PCS | Mod: CPTII,S$GLB,, | Performed by: FAMILY MEDICINE

## 2022-08-26 RX ORDER — PROMETHAZINE HYDROCHLORIDE AND DEXTROMETHORPHAN HYDROBROMIDE 6.25; 15 MG/5ML; MG/5ML
5 SYRUP ORAL EVERY 4 HOURS PRN
Qty: 240 ML | Refills: 0 | Status: SHIPPED | OUTPATIENT
Start: 2022-08-26 | End: 2022-09-05

## 2022-08-26 RX ORDER — BENZONATATE 100 MG/1
100 CAPSULE ORAL 3 TIMES DAILY PRN
Qty: 30 CAPSULE | Refills: 0 | Status: SHIPPED | OUTPATIENT
Start: 2022-08-26 | End: 2022-09-05

## 2022-08-26 NOTE — PATIENT INSTRUCTIONS
Ok to take tylenol/ ibuprofen as needed    Below are suggestions for symptomatic relief of respiratory symptoms:              -Salt water gargles to soothe throat pain.              -Chloroseptic spray and Cepacol lozenges also help to numb throat pain.              -Warm herbal teas with honey/lemon/ginger can help soothe sore throat and hoarseness              -Nasal saline spray reduces inflammation and dryness.              -Warm face compresses to help with facial sinus pain/pressure.              -Humidifiers and steam can help with nasal dryness and congestion              -Vicks vapor rub at night.              -Flonase OTC or Nasacort OTC for nasal congestion and post-nasal drip. Ok to use twice daily for the first week, then reduce to once daily after symptoms have begun to improve.              -Afrin is a nasal spray that can give immediate relief of nasal congestion but you cannot use this medication for more than 3 days              -Simple foods like chicken noodle soup.              - Mucinex for congestion or Mucinex DM for cough during the day time. Delsym helps with coughing at night. Mucinex-D if you have sinus pressure/sinus pain or chest congestion. (caution if history of high blood pressure or palpitations). You must increase your water intake when using expectorants (Mucinex).            -Zyrtec/Claritin/Allegra/Xyzal should help with allergies.  -If you DO NOT have Hypertension or any history of palpitations, it is ok to take over the counter Sudafed or Mucinex D or Allegra-D or Claritin-D or Zyrtec-D.  -If you do take one of the above, it is ok to combine that with plain over the counter Mucinex or Allegra or Claritin or Zyrtec. If, for example, you are taking Zyrtec -D, you can combine that with Mucinex, but not Mucinex-D.  If you are taking Mucinex-D, you can combine that with plain Allegra or Claritin or Zyrtec.   -If you DO have Hypertension or palpitations, it is safe to take Coricidin  HBP for relief of sinus symptoms.     Follow up with primary care provider in 7 days if no improvement or worsening symptoms.    Seek immediate care in the emergency room in the event of severe abdominal pain, chest pain, respiratory distress, fever unresponsive to antipyretic, dehydration, loss of consciousness.

## 2022-08-26 NOTE — LETTER
[address] [facility] ? [place] [address] ? Phone [phone] ? Fax [phone]           Return to Work/School    Patient: Daniel Akins Jr.  YOB: 1975   Date: 08/26/2022      To Whom It May Concern:     Daniel Akins Jr. was in contact with/seen in my office on 08/26/2022. COVID-19 is present in our communities across the [facility]. Not all patients are eligible or appropriate to be tested. In this situation, your employee meets the following criteria:     Daniel Akins Jr. has met the criteria for COVID-19 testing and has a NEGATIVE result. The employee can return to work once he feels better and is afebrile for 24 hours without the use of fever reducing medications (Tylenol, Motrin, etc).     If you have any questions or concerns, or if I can be of further assistance, please do not hesitate to contact me.     Sincerely,    Glenda Castro MD

## 2022-08-26 NOTE — PROGRESS NOTES
"Subjective:       Patient ID: Daniel Akins Jr. is a 46 y.o. male.    Vitals:  height is 5' 7" (1.702 m) and weight is 97.5 kg (215 lb). His temperature is 98.4 °F (36.9 °C). His blood pressure is 134/88 and his pulse is 70. His respiration is 18 and oxygen saturation is 97%.     Chief Complaint: Fever, bodyaches, and Cough    Patient presents to clinic with fever/body aches/cough/congestion x 2 days.    Fever   This is a new problem. The current episode started yesterday. The problem has been resolved. His temperature was unmeasured prior to arrival. Associated symptoms include congestion, coughing and muscle aches. Pertinent negatives include no chest pain, nausea, rash, sore throat, urinary pain or vomiting. He has tried acetaminophen for the symptoms. The treatment provided no relief.       Constitution: Positive for fever. Negative for activity change, appetite change, chills, fatigue and generalized weakness.   HENT: Positive for congestion. Negative for ear discharge, facial swelling, sinus pressure, sore throat, trouble swallowing and voice change.    Neck: Negative for neck stiffness and painful lymph nodes.   Cardiovascular: Negative for chest pain, leg swelling, palpitations and sob on exertion.   Eyes: Negative for vision loss.   Respiratory: Positive for cough. Negative for chest tightness and shortness of breath.    Gastrointestinal: Negative for nausea and vomiting.   Genitourinary: Negative for dysuria.   Skin: Negative for rash.   Neurological: Negative for passing out, disorientation, altered mental status and numbness.   Hematologic/Lymphatic: Negative for swollen lymph nodes.   Psychiatric/Behavioral: Negative for altered mental status, disorientation and confusion.       Objective:       Vitals:    08/26/22 1036   BP: 134/88   Pulse: 70   Resp: 18   Temp: 98.4 °F (36.9 °C)   SpO2: 97%   Weight: 97.5 kg (215 lb)   Height: 5' 7" (1.702 m)     Physical Exam   Constitutional: He is oriented to " person, place, and time.  Non-toxic appearance. He does not appear ill. No distress.   HENT:   Head: Atraumatic.   Mouth/Throat: No posterior oropharyngeal erythema.   Eyes: Conjunctivae are normal.   Neck: Neck supple.   Cardiovascular: Normal rate, regular rhythm, normal heart sounds and normal pulses.   Pulmonary/Chest: Effort normal and breath sounds normal.   Neurological: He is alert and oriented to person, place, and time.   Skin: Skin is not diaphoretic.   Psychiatric: Judgment and thought content normal.         Results for orders placed or performed in visit on 08/26/22   POCT COVID-19 Rapid Screening   Result Value Ref Range    POC Rapid COVID Negative Negative     Acceptable Yes      Assessment:       1. Cough    2. Viral URI with cough          Plan:         1. Cough  -     POCT COVID-19 Rapid Screening    2. Viral URI with cough  -     promethazine-dextromethorphan (PROMETHAZINE-DM) 6.25-15 mg/5 mL Syrp; Take 5 mLs by mouth every 4 (four) hours as needed (cough).  Dispense: 240 mL; Refill: 0  -     benzonatate (TESSALON) 100 MG capsule; Take 1 capsule (100 mg total) by mouth 3 (three) times daily as needed for Cough.  Dispense: 30 capsule; Refill: 0    Patient Instructions   Ok to take tylenol/ ibuprofen as needed    Below are suggestions for symptomatic relief of respiratory symptoms:              -Salt water gargles to soothe throat pain.              -Chloroseptic spray and Cepacol lozenges also help to numb throat pain.              -Warm herbal teas with honey/lemon/ginger can help soothe sore throat and hoarseness              -Nasal saline spray reduces inflammation and dryness.              -Warm face compresses to help with facial sinus pain/pressure.              -Humidifiers and steam can help with nasal dryness and congestion              -Vicks vapor rub at night.              -Flonase OTC or Nasacort OTC for nasal congestion and post-nasal drip. Ok to use twice daily for  the first week, then reduce to once daily after symptoms have begun to improve.              -Afrin is a nasal spray that can give immediate relief of nasal congestion but you cannot use this medication for more than 3 days              -Simple foods like chicken noodle soup.              - Mucinex for congestion or Mucinex DM for cough during the day time. Delsym helps with coughing at night. Mucinex-D if you have sinus pressure/sinus pain or chest congestion. (caution if history of high blood pressure or palpitations). You must increase your water intake when using expectorants (Mucinex).            -Zyrtec/Claritin/Allegra/Xyzal should help with allergies.  -If you DO NOT have Hypertension or any history of palpitations, it is ok to take over the counter Sudafed or Mucinex D or Allegra-D or Claritin-D or Zyrtec-D.  -If you do take one of the above, it is ok to combine that with plain over the counter Mucinex or Allegra or Claritin or Zyrtec. If, for example, you are taking Zyrtec -D, you can combine that with Mucinex, but not Mucinex-D.  If you are taking Mucinex-D, you can combine that with plain Allegra or Claritin or Zyrtec.   -If you DO have Hypertension or palpitations, it is safe to take Coricidin HBP for relief of sinus symptoms.     Follow up with primary care provider in 7 days if no improvement or worsening symptoms.    Seek immediate care in the emergency room in the event of severe abdominal pain, chest pain, respiratory distress, fever unresponsive to antipyretic, dehydration, loss of consciousness.

## 2022-12-31 ENCOUNTER — PATIENT MESSAGE (OUTPATIENT)
Dept: ADMINISTRATIVE | Facility: HOSPITAL | Age: 47
End: 2022-12-31
Payer: COMMERCIAL

## 2023-01-30 ENCOUNTER — PATIENT OUTREACH (OUTPATIENT)
Dept: ADMINISTRATIVE | Facility: HOSPITAL | Age: 48
End: 2023-01-30
Payer: COMMERCIAL